# Patient Record
Sex: MALE | Race: WHITE | NOT HISPANIC OR LATINO | URBAN - METROPOLITAN AREA
[De-identification: names, ages, dates, MRNs, and addresses within clinical notes are randomized per-mention and may not be internally consistent; named-entity substitution may affect disease eponyms.]

---

## 2019-08-05 ENCOUNTER — OUTPATIENT (OUTPATIENT)
Dept: OUTPATIENT SERVICES | Facility: HOSPITAL | Age: 79
LOS: 1 days | End: 2019-08-05
Payer: MEDICARE

## 2019-08-05 VITALS
HEART RATE: 67 BPM | DIASTOLIC BLOOD PRESSURE: 76 MMHG | OXYGEN SATURATION: 97 % | WEIGHT: 197.98 LBS | HEIGHT: 65 IN | TEMPERATURE: 98 F | SYSTOLIC BLOOD PRESSURE: 136 MMHG | RESPIRATION RATE: 18 BRPM

## 2019-08-05 DIAGNOSIS — Z98.890 OTHER SPECIFIED POSTPROCEDURAL STATES: Chronic | ICD-10-CM

## 2019-08-05 DIAGNOSIS — Z01.818 ENCOUNTER FOR OTHER PREPROCEDURAL EXAMINATION: ICD-10-CM

## 2019-08-05 DIAGNOSIS — G56.02 CARPAL TUNNEL SYNDROME, LEFT UPPER LIMB: ICD-10-CM

## 2019-08-05 DIAGNOSIS — M65.4 RADIAL STYLOID TENOSYNOVITIS [DE QUERVAIN]: ICD-10-CM

## 2019-08-05 LAB
ALBUMIN SERPL ELPH-MCNC: 3.9 G/DL — SIGNIFICANT CHANGE UP (ref 3.3–5)
ALP SERPL-CCNC: 55 U/L — SIGNIFICANT CHANGE UP (ref 40–120)
ALT FLD-CCNC: 39 U/L — SIGNIFICANT CHANGE UP (ref 12–78)
ANION GAP SERPL CALC-SCNC: 8 MMOL/L — SIGNIFICANT CHANGE UP (ref 5–17)
AST SERPL-CCNC: 29 U/L — SIGNIFICANT CHANGE UP (ref 15–37)
BILIRUB SERPL-MCNC: 0.7 MG/DL — SIGNIFICANT CHANGE UP (ref 0.2–1.2)
BUN SERPL-MCNC: 17 MG/DL — SIGNIFICANT CHANGE UP (ref 7–23)
CALCIUM SERPL-MCNC: 9.1 MG/DL — SIGNIFICANT CHANGE UP (ref 8.5–10.1)
CHLORIDE SERPL-SCNC: 112 MMOL/L — HIGH (ref 96–108)
CO2 SERPL-SCNC: 23 MMOL/L — SIGNIFICANT CHANGE UP (ref 22–31)
CREAT SERPL-MCNC: 0.93 MG/DL — SIGNIFICANT CHANGE UP (ref 0.5–1.3)
GLUCOSE SERPL-MCNC: 117 MG/DL — HIGH (ref 70–99)
HCT VFR BLD CALC: 42.2 % — SIGNIFICANT CHANGE UP (ref 39–50)
HGB BLD-MCNC: 14.5 G/DL — SIGNIFICANT CHANGE UP (ref 13–17)
MCHC RBC-ENTMCNC: 31 PG — SIGNIFICANT CHANGE UP (ref 27–34)
MCHC RBC-ENTMCNC: 34.4 GM/DL — SIGNIFICANT CHANGE UP (ref 32–36)
MCV RBC AUTO: 90.4 FL — SIGNIFICANT CHANGE UP (ref 80–100)
NRBC # BLD: 0 /100 WBCS — SIGNIFICANT CHANGE UP (ref 0–0)
PLATELET # BLD AUTO: 228 K/UL — SIGNIFICANT CHANGE UP (ref 150–400)
POTASSIUM SERPL-MCNC: 3.6 MMOL/L — SIGNIFICANT CHANGE UP (ref 3.5–5.3)
POTASSIUM SERPL-SCNC: 3.6 MMOL/L — SIGNIFICANT CHANGE UP (ref 3.5–5.3)
PROT SERPL-MCNC: 7.1 G/DL — SIGNIFICANT CHANGE UP (ref 6–8.3)
RBC # BLD: 4.67 M/UL — SIGNIFICANT CHANGE UP (ref 4.2–5.8)
RBC # FLD: 13.8 % — SIGNIFICANT CHANGE UP (ref 10.3–14.5)
SODIUM SERPL-SCNC: 143 MMOL/L — SIGNIFICANT CHANGE UP (ref 135–145)
WBC # BLD: 5.57 K/UL — SIGNIFICANT CHANGE UP (ref 3.8–10.5)
WBC # FLD AUTO: 5.57 K/UL — SIGNIFICANT CHANGE UP (ref 3.8–10.5)

## 2019-08-05 PROCEDURE — 93005 ELECTROCARDIOGRAM TRACING: CPT

## 2019-08-05 PROCEDURE — 36415 COLL VENOUS BLD VENIPUNCTURE: CPT

## 2019-08-05 PROCEDURE — G0463: CPT

## 2019-08-05 PROCEDURE — 93010 ELECTROCARDIOGRAM REPORT: CPT

## 2019-08-05 PROCEDURE — 85027 COMPLETE CBC AUTOMATED: CPT

## 2019-08-05 PROCEDURE — 80053 COMPREHEN METABOLIC PANEL: CPT

## 2019-08-05 NOTE — H&P PST ADULT - ASSESSMENT
80 y/o M presents for PST for scheduled left wrist 1st dorsal compartment release on 8/14 with no complaints at this time,    - electively planned for OR on 8/14 80 y/o M presents for PST for scheduled left wrist 1st dorsal compartment release on 8/14 with no complaints at this time,    - electively planned for OR on 8/14  - ekg  - cbc, cmp

## 2019-08-05 NOTE — H&P PST ADULT - HISTORY OF PRESENT ILLNESS
80 y/o M presents to presurgical testing for clearance for left wrist 1st dorsal compartment release scheduled for 8/14 with Dr. August. Pt states he had been having pain in the left wrist 4-5 weeks, denying any inciting injuries or factors. Pt describes the pain as sharp, and states he has been having tingling in the left hand in the am. Pt states he has been wearing a wrist brace overnight, in addition to having cortisone shots with no alleviation of pain. Pt was scheduled for the OR, pt denies chest pain, sob, n/v/d.

## 2019-08-14 ENCOUNTER — OUTPATIENT (OUTPATIENT)
Dept: OUTPATIENT SERVICES | Facility: HOSPITAL | Age: 79
LOS: 1 days | End: 2019-08-14
Payer: MEDICARE

## 2019-08-14 VITALS
OXYGEN SATURATION: 95 % | WEIGHT: 197.98 LBS | HEART RATE: 61 BPM | HEIGHT: 65 IN | RESPIRATION RATE: 15 BRPM | DIASTOLIC BLOOD PRESSURE: 70 MMHG | TEMPERATURE: 98 F | SYSTOLIC BLOOD PRESSURE: 134 MMHG

## 2019-08-14 VITALS
HEART RATE: 58 BPM | OXYGEN SATURATION: 97 % | SYSTOLIC BLOOD PRESSURE: 132 MMHG | RESPIRATION RATE: 15 BRPM | DIASTOLIC BLOOD PRESSURE: 60 MMHG

## 2019-08-14 DIAGNOSIS — Z98.890 OTHER SPECIFIED POSTPROCEDURAL STATES: Chronic | ICD-10-CM

## 2019-08-14 DIAGNOSIS — Z01.818 ENCOUNTER FOR OTHER PREPROCEDURAL EXAMINATION: ICD-10-CM

## 2019-08-14 DIAGNOSIS — G56.02 CARPAL TUNNEL SYNDROME, LEFT UPPER LIMB: ICD-10-CM

## 2019-08-14 DIAGNOSIS — M65.4 RADIAL STYLOID TENOSYNOVITIS [DE QUERVAIN]: ICD-10-CM

## 2019-08-14 PROCEDURE — 88304 TISSUE EXAM BY PATHOLOGIST: CPT | Mod: 26

## 2019-08-14 PROCEDURE — 88304 TISSUE EXAM BY PATHOLOGIST: CPT

## 2019-08-14 PROCEDURE — 25000 INCISION OF TENDON SHEATH: CPT | Mod: LT

## 2019-08-14 RX ORDER — CEFAZOLIN SODIUM 1 G
2000 VIAL (EA) INJECTION ONCE
Refills: 0 | Status: COMPLETED | OUTPATIENT
Start: 2019-08-14 | End: 2019-08-14

## 2019-08-14 RX ORDER — DOCUSATE SODIUM 100 MG
1 CAPSULE ORAL
Qty: 28 | Refills: 0
Start: 2019-08-14 | End: 2019-08-27

## 2019-08-14 RX ORDER — ONDANSETRON 8 MG/1
4 TABLET, FILM COATED ORAL ONCE
Refills: 0 | Status: DISCONTINUED | OUTPATIENT
Start: 2019-08-14 | End: 2019-08-14

## 2019-08-14 RX ORDER — SODIUM CHLORIDE 9 MG/ML
1000 INJECTION, SOLUTION INTRAVENOUS
Refills: 0 | Status: DISCONTINUED | OUTPATIENT
Start: 2019-08-14 | End: 2019-08-14

## 2019-08-14 RX ORDER — ASPIRIN/CALCIUM CARB/MAGNESIUM 324 MG
1 TABLET ORAL
Qty: 0 | Refills: 0 | DISCHARGE

## 2019-08-14 RX ORDER — ACETAMINOPHEN 500 MG
650 TABLET ORAL ONCE
Refills: 0 | Status: COMPLETED | OUTPATIENT
Start: 2019-08-14 | End: 2019-08-14

## 2019-08-14 RX ORDER — OXYCODONE HYDROCHLORIDE 5 MG/1
5 TABLET ORAL ONCE
Refills: 0 | Status: DISCONTINUED | OUTPATIENT
Start: 2019-08-14 | End: 2019-08-14

## 2019-08-14 RX ORDER — HYDROMORPHONE HYDROCHLORIDE 2 MG/ML
0.5 INJECTION INTRAMUSCULAR; INTRAVENOUS; SUBCUTANEOUS
Refills: 0 | Status: DISCONTINUED | OUTPATIENT
Start: 2019-08-14 | End: 2019-08-14

## 2019-08-14 RX ADMIN — SODIUM CHLORIDE 100 MILLILITER(S): 9 INJECTION, SOLUTION INTRAVENOUS at 16:26

## 2019-08-14 RX ADMIN — Medication 650 MILLIGRAM(S): at 12:30

## 2019-08-14 RX ADMIN — SODIUM CHLORIDE 60 MILLILITER(S): 9 INJECTION, SOLUTION INTRAVENOUS at 12:23

## 2019-08-14 NOTE — ASU DISCHARGE PLAN (ADULT/PEDIATRIC) - CALL YOUR DOCTOR IF YOU HAVE ANY OF THE FOLLOWING:
Numbness, tingling, color or temperature change to extremity/Swelling that gets worse/Bleeding that does not stop/Fever greater than (need to indicate Fahrenheit or Celsius)/Wound/Surgical Site with redness, or foul smelling discharge or pus

## 2019-08-14 NOTE — ASU DISCHARGE PLAN (ADULT/PEDIATRIC) - ASU DC SPECIAL INSTRUCTIONSFT
1. Keep dressing on for 5 days. Cover hand with plastic bag for showering.    2. Elevate hand as much as possible and wiggle fingers as much as you can, as often as you think of it (wiggle 10 times every commercial  if you are watching TV, or reading a book after every 5 pages read). The exception is if you have a splint you will not be able to wiggle the affected digit. Try to wiggle the free ones though.    3. Walk plenty, no sitting around.  4. See Dr. Dumont in the office in about 7-10 days. Call to schedule. You will have your wound checked then, any sutures will be removed, and your splint (if you have one on) will be changed.

## 2019-09-23 PROBLEM — E78.5 HYPERLIPIDEMIA, UNSPECIFIED: Chronic | Status: ACTIVE | Noted: 2019-08-05

## 2019-09-23 PROBLEM — N40.0 BENIGN PROSTATIC HYPERPLASIA WITHOUT LOWER URINARY TRACT SYMPTOMS: Chronic | Status: ACTIVE | Noted: 2019-08-05

## 2019-09-23 PROBLEM — I10 ESSENTIAL (PRIMARY) HYPERTENSION: Chronic | Status: ACTIVE | Noted: 2019-08-05

## 2019-09-27 ENCOUNTER — OUTPATIENT (OUTPATIENT)
Dept: OUTPATIENT SERVICES | Facility: HOSPITAL | Age: 79
LOS: 1 days | End: 2019-09-27
Payer: MEDICARE

## 2019-09-27 VITALS
WEIGHT: 195.99 LBS | HEART RATE: 65 BPM | HEIGHT: 66 IN | RESPIRATION RATE: 16 BRPM | DIASTOLIC BLOOD PRESSURE: 75 MMHG | TEMPERATURE: 98 F | SYSTOLIC BLOOD PRESSURE: 141 MMHG

## 2019-09-27 DIAGNOSIS — Z01.818 ENCOUNTER FOR OTHER PREPROCEDURAL EXAMINATION: ICD-10-CM

## 2019-09-27 DIAGNOSIS — Z98.890 OTHER SPECIFIED POSTPROCEDURAL STATES: Chronic | ICD-10-CM

## 2019-09-27 DIAGNOSIS — M65.4 RADIAL STYLOID TENOSYNOVITIS [DE QUERVAIN]: ICD-10-CM

## 2019-09-27 DIAGNOSIS — G56.01 CARPAL TUNNEL SYNDROME, RIGHT UPPER LIMB: ICD-10-CM

## 2019-09-27 DIAGNOSIS — I10 ESSENTIAL (PRIMARY) HYPERTENSION: ICD-10-CM

## 2019-09-27 DIAGNOSIS — Z98.890 OTHER SPECIFIED POSTPROCEDURAL STATES: ICD-10-CM

## 2019-09-27 LAB
ANION GAP SERPL CALC-SCNC: 7 MMOL/L — SIGNIFICANT CHANGE UP (ref 5–17)
BASOPHILS # BLD AUTO: 0.02 K/UL — SIGNIFICANT CHANGE UP (ref 0–0.2)
BASOPHILS NFR BLD AUTO: 0.5 % — SIGNIFICANT CHANGE UP (ref 0–2)
BUN SERPL-MCNC: 14 MG/DL — SIGNIFICANT CHANGE UP (ref 7–23)
CALCIUM SERPL-MCNC: 8.4 MG/DL — LOW (ref 8.5–10.1)
CHLORIDE SERPL-SCNC: 112 MMOL/L — HIGH (ref 96–108)
CO2 SERPL-SCNC: 24 MMOL/L — SIGNIFICANT CHANGE UP (ref 22–31)
CREAT SERPL-MCNC: 0.93 MG/DL — SIGNIFICANT CHANGE UP (ref 0.5–1.3)
EOSINOPHIL # BLD AUTO: 0.04 K/UL — SIGNIFICANT CHANGE UP (ref 0–0.5)
EOSINOPHIL NFR BLD AUTO: 1 % — SIGNIFICANT CHANGE UP (ref 0–6)
GLUCOSE SERPL-MCNC: 143 MG/DL — HIGH (ref 70–99)
HCT VFR BLD CALC: 40.4 % — SIGNIFICANT CHANGE UP (ref 39–50)
HGB BLD-MCNC: 13.8 G/DL — SIGNIFICANT CHANGE UP (ref 13–17)
IMM GRANULOCYTES NFR BLD AUTO: 0 % — SIGNIFICANT CHANGE UP (ref 0–1.5)
LYMPHOCYTES # BLD AUTO: 1.38 K/UL — SIGNIFICANT CHANGE UP (ref 1–3.3)
LYMPHOCYTES # BLD AUTO: 33.7 % — SIGNIFICANT CHANGE UP (ref 13–44)
MCHC RBC-ENTMCNC: 30.9 PG — SIGNIFICANT CHANGE UP (ref 27–34)
MCHC RBC-ENTMCNC: 34.2 GM/DL — SIGNIFICANT CHANGE UP (ref 32–36)
MCV RBC AUTO: 90.4 FL — SIGNIFICANT CHANGE UP (ref 80–100)
MONOCYTES # BLD AUTO: 0.42 K/UL — SIGNIFICANT CHANGE UP (ref 0–0.9)
MONOCYTES NFR BLD AUTO: 10.2 % — SIGNIFICANT CHANGE UP (ref 2–14)
NEUTROPHILS # BLD AUTO: 2.24 K/UL — SIGNIFICANT CHANGE UP (ref 1.8–7.4)
NEUTROPHILS NFR BLD AUTO: 54.6 % — SIGNIFICANT CHANGE UP (ref 43–77)
NRBC # BLD: 0 /100 WBCS — SIGNIFICANT CHANGE UP (ref 0–0)
PLATELET # BLD AUTO: 213 K/UL — SIGNIFICANT CHANGE UP (ref 150–400)
POTASSIUM SERPL-MCNC: 3.8 MMOL/L — SIGNIFICANT CHANGE UP (ref 3.5–5.3)
POTASSIUM SERPL-SCNC: 3.8 MMOL/L — SIGNIFICANT CHANGE UP (ref 3.5–5.3)
RBC # BLD: 4.47 M/UL — SIGNIFICANT CHANGE UP (ref 4.2–5.8)
RBC # FLD: 13.6 % — SIGNIFICANT CHANGE UP (ref 10.3–14.5)
SODIUM SERPL-SCNC: 143 MMOL/L — SIGNIFICANT CHANGE UP (ref 135–145)
WBC # BLD: 4.1 K/UL — SIGNIFICANT CHANGE UP (ref 3.8–10.5)
WBC # FLD AUTO: 4.1 K/UL — SIGNIFICANT CHANGE UP (ref 3.8–10.5)

## 2019-09-27 PROCEDURE — 85027 COMPLETE CBC AUTOMATED: CPT

## 2019-09-27 PROCEDURE — G0463: CPT

## 2019-09-27 PROCEDURE — 36415 COLL VENOUS BLD VENIPUNCTURE: CPT

## 2019-09-27 PROCEDURE — 80048 BASIC METABOLIC PNL TOTAL CA: CPT

## 2019-09-27 NOTE — H&P PST ADULT - NSANTHTOTALSCORECAL_ENT_A_CORE
Pt's wife Dane Sheets left vm this morning to cancel 10/3 appt with Dr. Deepthi Ferris, as pt can't stand on right leg due to pain. Dane Cordon asked for nurse to return call. 4

## 2019-09-27 NOTE — H&P PST ADULT - NSICDXPASTMEDICALHX_GEN_ALL_CORE_FT
PAST MEDICAL HISTORY:  BPH without urinary obstruction     History of carpal tunnel release     Hyperlipidemia     Hypertension

## 2019-09-27 NOTE — H&P PST ADULT - GENITOURINARY
Problem: Urinary Tract Infection (Adult)  Goal: Signs and Symptoms of Listed Potential Problems Will be Absent, Minimized or Managed (Urinary Tract Infection)  Signs and symptoms of listed potential problems will be absent, minimized or managed by discharge/transition of care (reference Urinary Tract Infection (Adult) CPG).   Outcome: No Change  Pt remains hospitalized for UTI, continues IV Levaquin.   A&O, forgetful. Vital signs stable, denies any pain, on RA.   PIV saline locked.   Up to BSC x 2 with assist x 1-2 with walker and gait belt.   Unable to void both times, bladder scanned for 392.  Not incont this shift.   Pt requested PRN Neb @ 0510 respiratory paged.   Will continue to monitor.     Attempted to straight cath pt and end of shift, not able to get any urine out. Passed along to oncoming RN. Dentures cleaned and in place.      details…

## 2019-09-27 NOTE — H&P PST ADULT - HISTORY OF PRESENT ILLNESS
78 y/o male presents to PST for scheduled right carpal tunnel release on 10/9/19. Patient reports pain and numbness mainly at night to right hand x several years, received steroid injection with minor relief.

## 2019-10-09 ENCOUNTER — OUTPATIENT (OUTPATIENT)
Dept: OUTPATIENT SERVICES | Facility: HOSPITAL | Age: 79
LOS: 1 days | End: 2019-10-09
Payer: MEDICARE

## 2019-10-09 VITALS
RESPIRATION RATE: 16 BRPM | WEIGHT: 197.98 LBS | HEART RATE: 52 BPM | TEMPERATURE: 98 F | SYSTOLIC BLOOD PRESSURE: 128 MMHG | DIASTOLIC BLOOD PRESSURE: 64 MMHG | HEIGHT: 66 IN | OXYGEN SATURATION: 97 %

## 2019-10-09 VITALS
OXYGEN SATURATION: 99 % | SYSTOLIC BLOOD PRESSURE: 132 MMHG | RESPIRATION RATE: 18 BRPM | DIASTOLIC BLOOD PRESSURE: 70 MMHG | HEART RATE: 63 BPM

## 2019-10-09 DIAGNOSIS — G56.01 CARPAL TUNNEL SYNDROME, RIGHT UPPER LIMB: ICD-10-CM

## 2019-10-09 DIAGNOSIS — Z98.890 OTHER SPECIFIED POSTPROCEDURAL STATES: Chronic | ICD-10-CM

## 2019-10-09 DIAGNOSIS — Z01.818 ENCOUNTER FOR OTHER PREPROCEDURAL EXAMINATION: ICD-10-CM

## 2019-10-09 DIAGNOSIS — M65.4 RADIAL STYLOID TENOSYNOVITIS [DE QUERVAIN]: ICD-10-CM

## 2019-10-09 PROCEDURE — 64721 CARPAL TUNNEL SURGERY: CPT | Mod: RT

## 2019-10-09 RX ORDER — OXYCODONE HYDROCHLORIDE 5 MG/1
5 TABLET ORAL ONCE
Refills: 0 | Status: DISCONTINUED | OUTPATIENT
Start: 2019-10-09 | End: 2019-10-09

## 2019-10-09 RX ORDER — SODIUM CHLORIDE 9 MG/ML
1000 INJECTION, SOLUTION INTRAVENOUS
Refills: 0 | Status: DISCONTINUED | OUTPATIENT
Start: 2019-10-09 | End: 2019-10-09

## 2019-10-09 RX ORDER — ONDANSETRON 8 MG/1
4 TABLET, FILM COATED ORAL ONCE
Refills: 0 | Status: DISCONTINUED | OUTPATIENT
Start: 2019-10-09 | End: 2019-10-09

## 2019-10-09 RX ORDER — CEFAZOLIN SODIUM 1 G
2000 VIAL (EA) INJECTION ONCE
Refills: 0 | Status: COMPLETED | OUTPATIENT
Start: 2019-10-09 | End: 2019-10-09

## 2019-10-09 RX ORDER — OXYCODONE HYDROCHLORIDE 5 MG/1
10 TABLET ORAL ONCE
Refills: 0 | Status: DISCONTINUED | OUTPATIENT
Start: 2019-10-09 | End: 2019-10-09

## 2019-10-09 RX ORDER — HYDROMORPHONE HYDROCHLORIDE 2 MG/ML
0.5 INJECTION INTRAMUSCULAR; INTRAVENOUS; SUBCUTANEOUS
Refills: 0 | Status: DISCONTINUED | OUTPATIENT
Start: 2019-10-09 | End: 2019-10-09

## 2019-10-09 RX ADMIN — SODIUM CHLORIDE 75 MILLILITER(S): 9 INJECTION, SOLUTION INTRAVENOUS at 09:40

## 2019-10-09 RX ADMIN — SODIUM CHLORIDE 30 MILLILITER(S): 9 INJECTION, SOLUTION INTRAVENOUS at 07:00

## 2019-10-09 NOTE — ASU PREOP CHECKLIST - VERIFY SURGICAL SITE/SIDE WITH PATIENT
Reviewed. Will refill when pharmacy is identified. Will route to sender to contact patient and update pharmacy for refill.   done/needs

## 2019-10-09 NOTE — ASU DISCHARGE PLAN (ADULT/PEDIATRIC) - CARE PROVIDER_API CALL
Laura August)  Orthopaedic Surgery  [address] [place] [address]  Phone: [phone]  Fax: [phone]  Follow Up Time:

## 2020-04-20 NOTE — H&P PST ADULT - BP NONINVASIVE SYSTOLIC (MM HG)
F: none  E: K >4, Mag >2  N: dysphagia; pureed and thin liquids per speech/swallow  GI: Famotidine 20mg daily  DVT ppx: lovenox 40mg subQ q24h  Code Status: DNR/DNI, confirmed NOT comfort care 136

## 2020-07-11 ENCOUNTER — INPATIENT (INPATIENT)
Facility: HOSPITAL | Age: 80
LOS: 2 days | Discharge: HOME | DRG: 554 | End: 2020-07-14
Attending: HOSPITALIST | Admitting: HOSPITALIST
Payer: MEDICARE

## 2020-07-11 VITALS
SYSTOLIC BLOOD PRESSURE: 145 MMHG | HEART RATE: 82 BPM | WEIGHT: 199.96 LBS | TEMPERATURE: 99 F | DIASTOLIC BLOOD PRESSURE: 82 MMHG | RESPIRATION RATE: 18 BRPM | OXYGEN SATURATION: 98 %

## 2020-07-11 DIAGNOSIS — Z98.890 OTHER SPECIFIED POSTPROCEDURAL STATES: Chronic | ICD-10-CM

## 2020-07-11 DIAGNOSIS — I10 ESSENTIAL (PRIMARY) HYPERTENSION: ICD-10-CM

## 2020-07-11 DIAGNOSIS — L03.116 CELLULITIS OF LEFT LOWER LIMB: ICD-10-CM

## 2020-07-11 DIAGNOSIS — Z79.899 OTHER LONG TERM (CURRENT) DRUG THERAPY: ICD-10-CM

## 2020-07-11 DIAGNOSIS — Z29.9 ENCOUNTER FOR PROPHYLACTIC MEASURES, UNSPECIFIED: ICD-10-CM

## 2020-07-11 DIAGNOSIS — E78.5 HYPERLIPIDEMIA, UNSPECIFIED: ICD-10-CM

## 2020-07-11 DIAGNOSIS — F41.9 ANXIETY DISORDER, UNSPECIFIED: ICD-10-CM

## 2020-07-11 DIAGNOSIS — N40.0 BENIGN PROSTATIC HYPERPLASIA WITHOUT LOWER URINARY TRACT SYMPTOMS: ICD-10-CM

## 2020-07-11 LAB
ALBUMIN SERPL ELPH-MCNC: 3.5 G/DL — SIGNIFICANT CHANGE UP (ref 3.3–5)
ALP SERPL-CCNC: 53 U/L — SIGNIFICANT CHANGE UP (ref 40–120)
ALT FLD-CCNC: 31 U/L — SIGNIFICANT CHANGE UP (ref 12–78)
ANION GAP SERPL CALC-SCNC: 8 MMOL/L — SIGNIFICANT CHANGE UP (ref 5–17)
AST SERPL-CCNC: 24 U/L — SIGNIFICANT CHANGE UP (ref 15–37)
B PERT IGG+IGM PNL SER: ABNORMAL
BASOPHILS # BLD AUTO: 0.02 K/UL — SIGNIFICANT CHANGE UP (ref 0–0.2)
BASOPHILS NFR BLD AUTO: 0.2 % — SIGNIFICANT CHANGE UP (ref 0–2)
BILIRUB SERPL-MCNC: 0.8 MG/DL — SIGNIFICANT CHANGE UP (ref 0.2–1.2)
BUN SERPL-MCNC: 23 MG/DL — SIGNIFICANT CHANGE UP (ref 7–23)
CALCIUM SERPL-MCNC: 9.1 MG/DL — SIGNIFICANT CHANGE UP (ref 8.5–10.1)
CHLORIDE SERPL-SCNC: 112 MMOL/L — HIGH (ref 96–108)
CO2 SERPL-SCNC: 22 MMOL/L — SIGNIFICANT CHANGE UP (ref 22–31)
COLOR FLD: YELLOW — SIGNIFICANT CHANGE UP
CREAT SERPL-MCNC: 1.1 MG/DL — SIGNIFICANT CHANGE UP (ref 0.5–1.3)
CRP SERPL-MCNC: 15.07 MG/DL — HIGH (ref 0–0.4)
EOSINOPHIL # BLD AUTO: 0 K/UL — SIGNIFICANT CHANGE UP (ref 0–0.5)
EOSINOPHIL NFR BLD AUTO: 0 % — SIGNIFICANT CHANGE UP (ref 0–6)
ERYTHROCYTE [SEDIMENTATION RATE] IN BLOOD: 72 MM/HR — HIGH (ref 0–20)
FLUID INTAKE SUBSTANCE CLASS: SIGNIFICANT CHANGE UP
FLUID SEGMENTED GRANULOCYTES: 50 % — SIGNIFICANT CHANGE UP
GLUCOSE SERPL-MCNC: 94 MG/DL — SIGNIFICANT CHANGE UP (ref 70–99)
GRAM STN FLD: SIGNIFICANT CHANGE UP
HCT VFR BLD CALC: 40.4 % — SIGNIFICANT CHANGE UP (ref 39–50)
HGB BLD-MCNC: 13.7 G/DL — SIGNIFICANT CHANGE UP (ref 13–17)
IMM GRANULOCYTES NFR BLD AUTO: 0.3 % — SIGNIFICANT CHANGE UP (ref 0–1.5)
LACTATE SERPL-SCNC: 1.2 MMOL/L — SIGNIFICANT CHANGE UP (ref 0.7–2)
LYMPHOCYTES # BLD AUTO: 1.08 K/UL — SIGNIFICANT CHANGE UP (ref 1–3.3)
LYMPHOCYTES # BLD AUTO: 8.4 % — LOW (ref 13–44)
LYMPHOCYTES # FLD: 13 % — SIGNIFICANT CHANGE UP
MCHC RBC-ENTMCNC: 30.9 PG — SIGNIFICANT CHANGE UP (ref 27–34)
MCHC RBC-ENTMCNC: 33.9 GM/DL — SIGNIFICANT CHANGE UP (ref 32–36)
MCV RBC AUTO: 91 FL — SIGNIFICANT CHANGE UP (ref 80–100)
MONOCYTES # BLD AUTO: 1.09 K/UL — HIGH (ref 0–0.9)
MONOCYTES NFR BLD AUTO: 8.5 % — SIGNIFICANT CHANGE UP (ref 2–14)
MONOS+MACROS # FLD: 37 % — SIGNIFICANT CHANGE UP
NEUTROPHILS # BLD AUTO: 10.61 K/UL — HIGH (ref 1.8–7.4)
NEUTROPHILS NFR BLD AUTO: 82.6 % — HIGH (ref 43–77)
NRBC # BLD: 0 /100 WBCS — SIGNIFICANT CHANGE UP (ref 0–0)
PLATELET # BLD AUTO: 229 K/UL — SIGNIFICANT CHANGE UP (ref 150–400)
POTASSIUM SERPL-MCNC: 4.3 MMOL/L — SIGNIFICANT CHANGE UP (ref 3.5–5.3)
POTASSIUM SERPL-SCNC: 4.3 MMOL/L — SIGNIFICANT CHANGE UP (ref 3.5–5.3)
PROT SERPL-MCNC: 7.7 G/DL — SIGNIFICANT CHANGE UP (ref 6–8.3)
RBC # BLD: 4.44 M/UL — SIGNIFICANT CHANGE UP (ref 4.2–5.8)
RBC # FLD: 14.4 % — SIGNIFICANT CHANGE UP (ref 10.3–14.5)
RCV VOL RI: 1000 /UL — HIGH (ref 0–0)
SARS-COV-2 RNA SPEC QL NAA+PROBE: SIGNIFICANT CHANGE UP
SODIUM SERPL-SCNC: 142 MMOL/L — SIGNIFICANT CHANGE UP (ref 135–145)
SYNOVIAL CRYSTALS CLARITY: ABNORMAL
SYNOVIAL CRYSTALS COLOR: YELLOW
SYNOVIAL CRYSTALS ID: ABNORMAL
SYNOVIAL CRYSTALS TUBE: SIGNIFICANT CHANGE UP
TOTAL NUCLEATED CELL COUNT, BODY FLUID: 3501 /UL — SIGNIFICANT CHANGE UP
TUBE TYPE: SIGNIFICANT CHANGE UP
URATE SERPL-MCNC: 5.1 MG/DL — SIGNIFICANT CHANGE UP (ref 3.4–8.8)
WBC # BLD: 12.84 K/UL — HIGH (ref 3.8–10.5)
WBC # FLD AUTO: 12.84 K/UL — HIGH (ref 3.8–10.5)

## 2020-07-11 PROCEDURE — 73562 X-RAY EXAM OF KNEE 3: CPT | Mod: 26,LT

## 2020-07-11 PROCEDURE — 93971 EXTREMITY STUDY: CPT | Mod: 26,LT

## 2020-07-11 PROCEDURE — 99223 1ST HOSP IP/OBS HIGH 75: CPT | Mod: GC,AI

## 2020-07-11 PROCEDURE — 93010 ELECTROCARDIOGRAM REPORT: CPT

## 2020-07-11 PROCEDURE — 99285 EMERGENCY DEPT VISIT HI MDM: CPT

## 2020-07-11 RX ORDER — FENOFIBRATE,MICRONIZED 130 MG
145 CAPSULE ORAL DAILY
Refills: 0 | Status: DISCONTINUED | OUTPATIENT
Start: 2020-07-12 | End: 2020-07-14

## 2020-07-11 RX ORDER — VANCOMYCIN HCL 1 G
1000 VIAL (EA) INTRAVENOUS EVERY 12 HOURS
Refills: 0 | Status: DISCONTINUED | OUTPATIENT
Start: 2020-07-12 | End: 2020-07-12

## 2020-07-11 RX ORDER — SODIUM CHLORIDE 9 MG/ML
1000 INJECTION INTRAMUSCULAR; INTRAVENOUS; SUBCUTANEOUS ONCE
Refills: 0 | Status: COMPLETED | OUTPATIENT
Start: 2020-07-11 | End: 2020-07-11

## 2020-07-11 RX ORDER — MORPHINE SULFATE 50 MG/1
4 CAPSULE, EXTENDED RELEASE ORAL ONCE
Refills: 0 | Status: DISCONTINUED | OUTPATIENT
Start: 2020-07-11 | End: 2020-07-11

## 2020-07-11 RX ORDER — METOPROLOL TARTRATE 50 MG
25 TABLET ORAL DAILY
Refills: 0 | Status: DISCONTINUED | OUTPATIENT
Start: 2020-07-11 | End: 2020-07-14

## 2020-07-11 RX ORDER — FENOFIBRATE,MICRONIZED 130 MG
145 CAPSULE ORAL ONCE
Refills: 0 | Status: COMPLETED | OUTPATIENT
Start: 2020-07-11 | End: 2020-07-11

## 2020-07-11 RX ORDER — ATORVASTATIN CALCIUM 80 MG/1
20 TABLET, FILM COATED ORAL AT BEDTIME
Refills: 0 | Status: DISCONTINUED | OUTPATIENT
Start: 2020-07-11 | End: 2020-07-14

## 2020-07-11 RX ORDER — TAMSULOSIN HYDROCHLORIDE 0.4 MG/1
0.4 CAPSULE ORAL AT BEDTIME
Refills: 0 | Status: DISCONTINUED | OUTPATIENT
Start: 2020-07-11 | End: 2020-07-14

## 2020-07-11 RX ORDER — MORPHINE SULFATE 50 MG/1
2 CAPSULE, EXTENDED RELEASE ORAL ONCE
Refills: 0 | Status: DISCONTINUED | OUTPATIENT
Start: 2020-07-11 | End: 2020-07-11

## 2020-07-11 RX ORDER — ASPIRIN/CALCIUM CARB/MAGNESIUM 324 MG
81 TABLET ORAL DAILY
Refills: 0 | Status: DISCONTINUED | OUTPATIENT
Start: 2020-07-11 | End: 2020-07-14

## 2020-07-11 RX ORDER — MORPHINE SULFATE 50 MG/1
2 CAPSULE, EXTENDED RELEASE ORAL EVERY 4 HOURS
Refills: 0 | Status: DISCONTINUED | OUTPATIENT
Start: 2020-07-11 | End: 2020-07-12

## 2020-07-11 RX ORDER — ACETAMINOPHEN 500 MG
650 TABLET ORAL EVERY 6 HOURS
Refills: 0 | Status: DISCONTINUED | OUTPATIENT
Start: 2020-07-11 | End: 2020-07-14

## 2020-07-11 RX ORDER — PIPERACILLIN AND TAZOBACTAM 4; .5 G/20ML; G/20ML
3.38 INJECTION, POWDER, LYOPHILIZED, FOR SOLUTION INTRAVENOUS EVERY 8 HOURS
Refills: 0 | Status: DISCONTINUED | OUTPATIENT
Start: 2020-07-11 | End: 2020-07-12

## 2020-07-11 RX ORDER — VANCOMYCIN HCL 1 G
1000 VIAL (EA) INTRAVENOUS ONCE
Refills: 0 | Status: COMPLETED | OUTPATIENT
Start: 2020-07-11 | End: 2020-07-11

## 2020-07-11 RX ORDER — OXYCODONE AND ACETAMINOPHEN 5; 325 MG/1; MG/1
1 TABLET ORAL EVERY 6 HOURS
Refills: 0 | Status: DISCONTINUED | OUTPATIENT
Start: 2020-07-11 | End: 2020-07-12

## 2020-07-11 RX ORDER — FENOFIBRATE,MICRONIZED 130 MG
130 CAPSULE ORAL ONCE
Refills: 0 | Status: DISCONTINUED | OUTPATIENT
Start: 2020-07-11 | End: 2020-07-11

## 2020-07-11 RX ORDER — SODIUM CHLORIDE 9 MG/ML
1000 INJECTION, SOLUTION INTRAVENOUS
Refills: 0 | Status: DISCONTINUED | OUTPATIENT
Start: 2020-07-11 | End: 2020-07-11

## 2020-07-11 RX ORDER — PIPERACILLIN AND TAZOBACTAM 4; .5 G/20ML; G/20ML
3.38 INJECTION, POWDER, LYOPHILIZED, FOR SOLUTION INTRAVENOUS ONCE
Refills: 0 | Status: COMPLETED | OUTPATIENT
Start: 2020-07-11 | End: 2020-07-11

## 2020-07-11 RX ADMIN — MORPHINE SULFATE 4 MILLIGRAM(S): 50 CAPSULE, EXTENDED RELEASE ORAL at 15:15

## 2020-07-11 RX ADMIN — PIPERACILLIN AND TAZOBACTAM 200 GRAM(S): 4; .5 INJECTION, POWDER, LYOPHILIZED, FOR SOLUTION INTRAVENOUS at 18:01

## 2020-07-11 RX ADMIN — Medication 250 MILLIGRAM(S): at 18:36

## 2020-07-11 RX ADMIN — MORPHINE SULFATE 2 MILLIGRAM(S): 50 CAPSULE, EXTENDED RELEASE ORAL at 23:08

## 2020-07-11 RX ADMIN — PIPERACILLIN AND TAZOBACTAM 3.38 GRAM(S): 4; .5 INJECTION, POWDER, LYOPHILIZED, FOR SOLUTION INTRAVENOUS at 18:35

## 2020-07-11 RX ADMIN — SODIUM CHLORIDE 1000 MILLILITER(S): 9 INJECTION INTRAMUSCULAR; INTRAVENOUS; SUBCUTANEOUS at 15:15

## 2020-07-11 RX ADMIN — PIPERACILLIN AND TAZOBACTAM 25 GRAM(S): 4; .5 INJECTION, POWDER, LYOPHILIZED, FOR SOLUTION INTRAVENOUS at 22:16

## 2020-07-11 RX ADMIN — SODIUM CHLORIDE 75 MILLILITER(S): 9 INJECTION, SOLUTION INTRAVENOUS at 18:37

## 2020-07-11 RX ADMIN — Medication 145 MILLIGRAM(S): at 23:03

## 2020-07-11 RX ADMIN — MORPHINE SULFATE 2 MILLIGRAM(S): 50 CAPSULE, EXTENDED RELEASE ORAL at 19:37

## 2020-07-11 RX ADMIN — SODIUM CHLORIDE 1000 MILLILITER(S): 9 INJECTION INTRAMUSCULAR; INTRAVENOUS; SUBCUTANEOUS at 16:30

## 2020-07-11 RX ADMIN — ATORVASTATIN CALCIUM 20 MILLIGRAM(S): 80 TABLET, FILM COATED ORAL at 23:03

## 2020-07-11 NOTE — H&P ADULT - PROBLEM SELECTOR PLAN 7
- lovenox 40mg QD  - IMPROVE VTE Individual Risk Assessment          RISK                                                          Points  [  ] Previous VTE                                                3  [  ] Thrombophilia                                             2  [  ] Lower limb paralysis                                   2        (unable to hold up >15 seconds)    [  ] Current Cancer                                             2         (within 6 months)  [  ] Immobilization > 24 hrs                              1  [  ] ICU/CCU stay > 24 hours                             1  [ x ] Age > 60                                                         1    IMPROVE VTE Score: 1

## 2020-07-11 NOTE — H&P ADULT - HISTORY OF PRESENT ILLNESS
81 yo male with PMhx of prostate cancer, HTN, HLD presents to ED with left knee pain. Pt was gardening on his knees 2 days ago and began to feel right knee pain. He's been treating his pain with aspirin and decided to come to ED when he became unable to ambulate 2/2 the pain. Denies fevers, chest pain, HA, abd pain, rashes, any other joint pain. 81 yo male with PMhx BPH, HTN, HLD presents to ED with left knee pain. Pt was gardening on his knees 2 days ago and began to feel right knee pain. He's been treating his pain with aspirin and decided to come to ED when he became unable to ambulate 2/2 the pain. Pt normally able to ambulate well, very active. Denies fevers, chest pain, HA, abd pain, rashes, any other joint pain, chemotherapy hx.    In ED:   Vital Signs: T(F): 98.2, HR: 73, BP: 126/72, RR: 16, SpO2: 96%     Given: NS 1L bolus, vanco 1g, zosyn 3.375g, morphine 4mg    Labs: WBC 12.84, sed rate 72, Cr 1.10    Imaging: doppler negative for DVT left leg, left knee xray: Mild osteoarthritic change. Suprapatellar effusion,     Consult: ortho -Low suspicion for joint involvement, minimal effusion. Knee aspirated with 10cc of straw colored normal synovial fluid, likely component of early prepatellar/infrapatellar bursitis but no focal fluid collection to I+D,  Unlikely to require surgical intervention based on current presentation    Pt admitted for abx 2/2 cellulitis 79 yo male with PMhx BPH, HTN, HLD, colon cancer 4 years ago, presents to ED with left knee pain. Pt was gardening on his knees 2 days ago and began to feel right knee pain. He's been treating his pain with aspirin and decided to come to ED when he became unable to ambulate 2/2 the pain. Pt normally able to ambulate well, very active. Denies fevers, chest pain, HA, abd pain, rashes, any other joint pain, chemotherapy/radiation hx.    In ED:   Vital Signs: T(F): 98.2, HR: 73, BP: 126/72, RR: 16, SpO2: 96%     Given: NS 1L bolus, vanco 1g, zosyn 3.375g, morphine 4mg    Labs: WBC 12.84, sed rate 72, Cr 1.10    Imaging: doppler negative for DVT left leg, left knee xray: Mild osteoarthritic change. Suprapatellar effusion,     Consult: ortho -Low suspicion for joint involvement, minimal effusion. Knee aspirated with 10cc of straw colored normal synovial fluid, likely component of early prepatellar/infrapatellar bursitis but no focal fluid collection to I+D, Unlikely to require surgical intervention based on current presentation    Pt admitted for abx 2/2 cellulitis 81 yo male with PMhx BPH, HTN, HLD, colon cancer 4 years ago, presents to ED with left knee pain. Pt was gardening on his knees 2 days ago and began to feel right knee pain. He's been treating his pain with aspirin and decided to come to ED when he became unable to ambulate 2/2 the pain. Pt normally able to ambulate well, very active. Denies fevers, chest pain, HA, abd pain, rashes, any other joint pain, chemotherapy/radiation hx.    In ED:     Given: NS 1L bolus, vanco 1g, zosyn 3.375g, morphine 4mg    Labs: WBC 12.84, sed rate 72, Cr 1.10    Imaging: doppler negative for DVT left leg, left knee xray: Mild osteoarthritic change. Suprapatellar effusion,     Consult: ortho -Low suspicion for joint involvement, minimal effusion. Knee aspirated with 10cc of straw colored normal synovial fluid, likely component of early prepatellar/infrapatellar bursitis but no focal fluid collection to I+D, Unlikely to require surgical intervention based on current presentation

## 2020-07-11 NOTE — H&P ADULT - PROBLEM SELECTOR PLAN 4
continue home medications atrovastatin 20mg QD, fenofibrate 130mg QD continue home medications metoprolol 25mg QD  after confirming with wife  - DASH diet continue home medications tamsulosin 0.4mg QD

## 2020-07-11 NOTE — H&P ADULT - ASSESSMENT
81 yo male PMhx 79 yo male is admitted for cellulitis 79 yo male with PMhx BPH, HTN, HLD presents to ED with left knee pain. Admitted for cellulitis of left leg 81 yo male with PMhx BPH, HTN, HLD, colon cancer 4 years ago, presents to ED with left knee pain. Admitted for cellulitis of left leg

## 2020-07-11 NOTE — H&P ADULT - PROBLEM SELECTOR PLAN 1
- pt left lower extremity erythematous well demarcated, warm, swollen  - abx for cellulitis ordered, vanco 1g Q12, zosyn 3.375g Q8  - f/u vanco troughs Q12     - ortho consulted, will f/u, continue ortho recs, Low suspicion for joint involvement, minimal effusion. Knee aspirated with 10cc of straw colored normal synovial fluid,   - Trend ESR/CRP  - FU knee synovial results  - ICE and elevation leg - pt left lower extremity erythematous well demarcated, warm, swollen  - abx for cellulitis started, vanco 1g Q12, zosyn 3.375g Q8, pending further workup  - f/u vanco troughs pre 4th dose, adjust as necessary  - f/u blood cx  - ortho consulted, will f/u, continue ortho recs, Low suspicion for joint involvement, minimal effusion. Knee aspirated with 10cc of straw colored normal synovial fluid,   - Trend ESR/CRP  - FU knee synovial results  - ICE and elevation leg - pt left lower extremity erythematous well demarcated, warm, swollen  - s/p in ED vanco 1g, zosyn 3.375g  - continuing vanco 1g Q12, zosyn 3.375g Q8  - f/u vanco troughs pre 4th dose, adjust as necessary  - f/u blood cx  - ortho consulted, Low suspicion for joint involvement. Knee aspirated with 10cc of straw colored normal synovial fluid, f/u knee synovial results  - Trend ESR/CRP  - ICE and elevation leg

## 2020-07-11 NOTE — ED ADULT NURSE NOTE - OBJECTIVE STATEMENT
received pt in bed #13b Pt A&O c/o left knee pain w/ redness, warmth & swelling since thursday Pt states he was kneeling weeding in the garden on thursday.

## 2020-07-11 NOTE — H&P ADULT - NSHPPHYSICALEXAM_GEN_ALL_CORE
T(C): 36.8 (07-11-20 @ 18:20), Max: 37 (07-11-20 @ 14:37)  HR: 73 (07-11-20 @ 18:20) (73 - 82)  BP: 126/72 (07-11-20 @ 18:20) (126/72 - 145/82)  RR: 16 (07-11-20 @ 18:20) (16 - 18)  SpO2: 96% (07-11-20 @ 18:20) (96% - 98%)    GENERAL: patient appears well, no acute distress, conversant  EYES: anicteric sclerae, no exudates  ENMT: oropharynx clear without erythema, no exudates, moist mucous membranes  NECK: supple, soft, no thyromegaly noted  LUNGS: clear to auscultation, no intercostal retractions  HEART: S1/S2, regular rate and rhythm, no murmurs noted, no lower extremity edema  GASTROINTESTINAL: abdomen is soft, nontender, nondistended, normoactive bowel sounds, no palpable masses  INTEGUMENT: good skin turgor, warm skin, appears well perfused  MUSCULOSKELETAL: no clubbing or cyanosis, no obvious deformity, left leg erythematous from ankle to just above left knee, well demarcated, left leg is warm, unable to move leg 2/2 pain, +3 pulses b/l  NEUROLOGIC: awake, alert, oriented x3, good muscle tone in 4 extremities, no obvious sensory deficits  PSYCHIATRIC: mood is good, affect is congruent, linear and logical thought process  HEME/LYMPH: no palpable supraclavicular nodules, no obvious ecchymosis or petechiae T(C): 36.8 (07-11-20 @ 18:20), Max: 37 (07-11-20 @ 14:37)  HR: 73 (07-11-20 @ 18:20) (73 - 82)  BP: 126/72 (07-11-20 @ 18:20) (126/72 - 145/82)  RR: 16 (07-11-20 @ 18:20) (16 - 18)  SpO2: 96% (07-11-20 @ 18:20) (96% - 98%)    GENERAL: patient appears well, no acute distress, conversant  EYES: anicteric sclerae, no exudates  ENMT: oropharynx clear without erythema, no exudates, moist mucous membranes  NECK: supple, soft, no thyromegaly noted  LUNGS: clear to auscultation, no intercostal retractions  HEART: S1/S2, regular rate and rhythm, no murmurs noted, no lower extremity edema  GASTROINTESTINAL: abdomen is soft, nontender, nondistended, normoactive bowel sounds, no palpable masses  INTEGUMENT: good skin turgor, warm skin, appears well perfused  MUSCULOSKELETAL: no clubbing or cyanosis, no obvious deformity, left leg erythematous from ankle to just above left knee, well demarcated, left leg is warm, unable to move leg 2/2 pain, +2 pulses b/l, negative hommans sign  NEUROLOGIC: awake, alert, oriented x3, good muscle tone in 4 extremities, no obvious sensory deficits  PSYCHIATRIC: mood is good, affect is congruent, linear and logical thought process  HEME/LYMPH: no palpable supraclavicular nodules, no obvious ecchymosis or petechiae

## 2020-07-11 NOTE — H&P ADULT - PROBLEM SELECTOR PLAN 6
- lovenox 40mg QD  - IMPROVE VTE Individual Risk Assessment          RISK                                                          Points  [  ] Previous VTE                                                3  [  ] Thrombophilia                                             2  [  ] Lower limb paralysis                                   2        (unable to hold up >15 seconds)    [  ] Current Cancer                                             2         (within 6 months)  [  ] Immobilization > 24 hrs                              1  [  ] ICU/CCU stay > 24 hours                             1  [ x ] Age > 60                                                         1    IMPROVE VTE Score: 1 - pt developed anxiety after colon cancer dx  - continue home medications alprazolam 0.25mg PRN after confirming with wife - pt developed anxiety after colon cancer dx (per daughter)   - pt has home medications alprazolam 0.25mg PRN  - may consider home medications if necessary

## 2020-07-11 NOTE — ED PROVIDER NOTE - ATTENDING CONTRIBUTION TO CARE
I have personally performed a face to face diagnostic evaluation on this patient.  I have reviewed the PA note and agree with the history, exam, and plan of care, except as noted.     80 M with left knee and lower leg erythema with reported trauma without injury and recent gardening- no laceration or injury. Unable to bend knee.    On exam:  Gen: comfortable, no acute distress  Msk: diffuse swelling and tenderness of left knee with erythema extending down to lower shin.    Concern for septic arthritis with overlying cellulitis, r/o DVT.

## 2020-07-11 NOTE — ED ADULT NURSE NOTE - PMH
BPH without urinary obstruction    History of carpal tunnel release    Hyperlipidemia    Hypertension

## 2020-07-11 NOTE — H&P ADULT - PROBLEM SELECTOR PLAN 5
- lovenox 40mg QD  - IMPROVE VTE Individual Risk Assessment          RISK                                                          Points  [  ] Previous VTE                                                3  [  ] Thrombophilia                                             2  [  ] Lower limb paralysis                                   2        (unable to hold up >15 seconds)    [  ] Current Cancer                                             2         (within 6 months)  [  ] Immobilization > 24 hrs                              1  [  ] ICU/CCU stay > 24 hours                             1  [ x ] Age > 60                                                         1    IMPROVE VTE Score: 1 - pt developed anxiety after colon cancer dx (as per courtney)  - continue home medications alprazolam 0.25mg PRN continue home medications atrovastatin 20mg QD, fenofibrate 130mg QD  after confirming with wife continue home medications atorvastatin 20mg QD, fenofibrate 130mg QD  after confirming with wife continue home medications atorvastatin 20mg QD, fenofibrate 130mg QD

## 2020-07-11 NOTE — H&P ADULT - NSHPREVIEWOFSYSTEMS_GEN_ALL_CORE
CONSTITUTIONAL: denies fever, chills, fatigue, weakness  HEENT: denies blurred vision, sore throat  SKIN: denies new lesions, rash  CARDIOVASCULAR: denies chest pain, chest pressure, palpitations  RESPIRATORY: denies shortness of breath, sputum production  GASTROINTESTINAL: denies nausea, vomiting, diarrhea, abdominal pain  GENITOURINARY: denies dysuria, discharge  NEUROLOGICAL: denies numbness, headache, focal weakness  MUSCULOSKELETAL: denies new joint pain, muscle aches  HEMATOLOGIC: denies gross bleeding, bruising  LYMPHATICS: denies enlarged lymph nodes  PSYCHIATRIC: denies recent changes in anxiety, depression  ENDOCRINOLOGIC: denies sweating, cold or heat intolerance CONSTITUTIONAL: denies fever, chills, fatigue, weakness  HEENT: denies blurred vision, sore throat  SKIN: denies new lesions, rash  CARDIOVASCULAR: denies chest pain, chest pressure, palpitations  RESPIRATORY: denies shortness of breath, sputum production  GASTROINTESTINAL: denies nausea, vomiting, diarrhea, abdominal pain  GENITOURINARY: denies dysuria, discharge  NEUROLOGICAL: denies numbness, headache, focal weakness  MUSCULOSKELETAL: unable to move left knee 2/2 pain  HEMATOLOGIC: denies gross bleeding, bruising  LYMPHATICS: denies enlarged lymph nodes  PSYCHIATRIC: denies recent changes in anxiety, depression  ENDOCRINOLOGIC: denies sweating, cold or heat intolerance

## 2020-07-11 NOTE — ED PROVIDER NOTE - OBJECTIVE STATEMENT
Pt is a 81 yo male with pmhx of BPH without urinary obstruction Hyperlipidemia Hypertension here for left leg swelling redness. Pt states he hit his left knee getting out of boat one week ago no swelling or redness immediately pt admits to he was gardening a few days ago and leaning on knee and developed pain redness swelling 2 days ago getting worse unable to walk denies any laceration fever chills chest pain sob. denies hx of gout    pcp Didi

## 2020-07-11 NOTE — H&P ADULT - PROBLEM SELECTOR PLAN 2
continue home medications tamsulosin 0.4mg QD pt pharmacy [facility] in [place], currently closed  - wife not answering calls, daughter unsure of medications  - f/u with wife in AM for confirmed medication list pt pharmacy [facility] in [place], currently closed  confirmed medication with wife. - chronic HTN pt, well controlled on admission 118/66,   - takes home medications metoprolol 25mg QD  - to confirm medications in AM, continue metoprolol 25mg QD with hold parameters while inpt  - DASH diet

## 2020-07-11 NOTE — H&P ADULT - NSHPSOCIALHISTORY_GEN_ALL_CORE
Patient denies smoking and drug use, admits to drinking a beer with lunch and dinner. He lives with his wife of 59 years, is very active around the house without needing any help.

## 2020-07-11 NOTE — ED PROVIDER NOTE - CLINICAL SUMMARY MEDICAL DECISION MAKING FREE TEXT BOX
Pt is a 79 yo male with left knee pain swelling redness will get labs us and doppler fluids pain control

## 2020-07-11 NOTE — H&P ADULT - PROBLEM SELECTOR PLAN 3
continue home medications metoprolol 25mg QD continue home medications metoprolol 25mg QD  - DASH diet continue home medications tamsulosin 0.4mg QD  after confirming with wife pt pharmacy [facility] in [place], currently closed  attempted to confirm medication with wife, she didn't answer the phone, incomplete medication reconciliation, primary team to complete in AM

## 2020-07-11 NOTE — H&P ADULT - NSICDXPASTMEDICALHX_GEN_ALL_CORE_FT
PAST MEDICAL HISTORY:  BPH without urinary obstruction     History of carpal tunnel release     Hyperlipidemia     Hypertension     Prostate cancer PAST MEDICAL HISTORY:  BPH without urinary obstruction     Colon cancer stage 1, removed cecum 4 years ago    History of carpal tunnel release     Hyperlipidemia     Hypertension

## 2020-07-12 DIAGNOSIS — M11.20 OTHER CHONDROCALCINOSIS, UNSPECIFIED SITE: ICD-10-CM

## 2020-07-12 LAB
ALBUMIN SERPL ELPH-MCNC: 2.7 G/DL — LOW (ref 3.3–5)
ALP SERPL-CCNC: 44 U/L — SIGNIFICANT CHANGE UP (ref 40–120)
ALT FLD-CCNC: 24 U/L — SIGNIFICANT CHANGE UP (ref 12–78)
ANION GAP SERPL CALC-SCNC: 9 MMOL/L — SIGNIFICANT CHANGE UP (ref 5–17)
AST SERPL-CCNC: 17 U/L — SIGNIFICANT CHANGE UP (ref 15–37)
BASOPHILS # BLD AUTO: 0.01 K/UL — SIGNIFICANT CHANGE UP (ref 0–0.2)
BASOPHILS NFR BLD AUTO: 0.1 % — SIGNIFICANT CHANGE UP (ref 0–2)
BILIRUB SERPL-MCNC: 0.7 MG/DL — SIGNIFICANT CHANGE UP (ref 0.2–1.2)
BUN SERPL-MCNC: 20 MG/DL — SIGNIFICANT CHANGE UP (ref 7–23)
CALCIUM SERPL-MCNC: 8.1 MG/DL — LOW (ref 8.5–10.1)
CHLORIDE SERPL-SCNC: 112 MMOL/L — HIGH (ref 96–108)
CO2 SERPL-SCNC: 21 MMOL/L — LOW (ref 22–31)
CREAT SERPL-MCNC: 1 MG/DL — SIGNIFICANT CHANGE UP (ref 0.5–1.3)
CRP SERPL-MCNC: 12.15 MG/DL — HIGH (ref 0–0.4)
EOSINOPHIL # BLD AUTO: 0.03 K/UL — SIGNIFICANT CHANGE UP (ref 0–0.5)
EOSINOPHIL NFR BLD AUTO: 0.3 % — SIGNIFICANT CHANGE UP (ref 0–6)
ERYTHROCYTE [SEDIMENTATION RATE] IN BLOOD: 73 MM/HR — HIGH (ref 0–20)
GLUCOSE SERPL-MCNC: 104 MG/DL — HIGH (ref 70–99)
HCT VFR BLD CALC: 35.1 % — LOW (ref 39–50)
HGB BLD-MCNC: 12 G/DL — LOW (ref 13–17)
IMM GRANULOCYTES NFR BLD AUTO: 0.3 % — SIGNIFICANT CHANGE UP (ref 0–1.5)
LYMPHOCYTES # BLD AUTO: 1.06 K/UL — SIGNIFICANT CHANGE UP (ref 1–3.3)
LYMPHOCYTES # BLD AUTO: 10.9 % — LOW (ref 13–44)
MCHC RBC-ENTMCNC: 31 PG — SIGNIFICANT CHANGE UP (ref 27–34)
MCHC RBC-ENTMCNC: 34.2 GM/DL — SIGNIFICANT CHANGE UP (ref 32–36)
MCV RBC AUTO: 90.7 FL — SIGNIFICANT CHANGE UP (ref 80–100)
MONOCYTES # BLD AUTO: 1.13 K/UL — HIGH (ref 0–0.9)
MONOCYTES NFR BLD AUTO: 11.6 % — SIGNIFICANT CHANGE UP (ref 2–14)
NEUTROPHILS # BLD AUTO: 7.46 K/UL — HIGH (ref 1.8–7.4)
NEUTROPHILS NFR BLD AUTO: 76.8 % — SIGNIFICANT CHANGE UP (ref 43–77)
NRBC # BLD: 0 /100 WBCS — SIGNIFICANT CHANGE UP (ref 0–0)
PLATELET # BLD AUTO: 224 K/UL — SIGNIFICANT CHANGE UP (ref 150–400)
POTASSIUM SERPL-MCNC: 3.8 MMOL/L — SIGNIFICANT CHANGE UP (ref 3.5–5.3)
POTASSIUM SERPL-SCNC: 3.8 MMOL/L — SIGNIFICANT CHANGE UP (ref 3.5–5.3)
PROT SERPL-MCNC: 6.6 G/DL — SIGNIFICANT CHANGE UP (ref 6–8.3)
RBC # BLD: 3.87 M/UL — LOW (ref 4.2–5.8)
RBC # FLD: 14.3 % — SIGNIFICANT CHANGE UP (ref 10.3–14.5)
SARS-COV-2 IGG SERPL QL IA: NEGATIVE — SIGNIFICANT CHANGE UP
SARS-COV-2 IGM SERPL IA-ACNC: 0.09 INDEX — SIGNIFICANT CHANGE UP
SODIUM SERPL-SCNC: 142 MMOL/L — SIGNIFICANT CHANGE UP (ref 135–145)
WBC # BLD: 9.72 K/UL — SIGNIFICANT CHANGE UP (ref 3.8–10.5)
WBC # FLD AUTO: 9.72 K/UL — SIGNIFICANT CHANGE UP (ref 3.8–10.5)

## 2020-07-12 PROCEDURE — 99233 SBSQ HOSP IP/OBS HIGH 50: CPT | Mod: GC

## 2020-07-12 PROCEDURE — 99232 SBSQ HOSP IP/OBS MODERATE 35: CPT | Mod: GC

## 2020-07-12 PROCEDURE — 99223 1ST HOSP IP/OBS HIGH 75: CPT

## 2020-07-12 RX ORDER — ALPRAZOLAM 0.25 MG
1 TABLET ORAL
Qty: 0 | Refills: 0 | DISCHARGE

## 2020-07-12 RX ORDER — FENOFIBRATE,MICRONIZED 130 MG
1 CAPSULE ORAL
Qty: 0 | Refills: 0 | DISCHARGE

## 2020-07-12 RX ORDER — COLCHICINE 0.6 MG
1.2 TABLET ORAL ONCE
Refills: 0 | Status: COMPLETED | OUTPATIENT
Start: 2020-07-12 | End: 2020-07-12

## 2020-07-12 RX ORDER — TAMSULOSIN HYDROCHLORIDE 0.4 MG/1
1 CAPSULE ORAL
Qty: 0 | Refills: 0 | DISCHARGE

## 2020-07-12 RX ORDER — ASPIRIN/CALCIUM CARB/MAGNESIUM 324 MG
1 TABLET ORAL
Qty: 0 | Refills: 0 | DISCHARGE

## 2020-07-12 RX ORDER — MORPHINE SULFATE 50 MG/1
0.5 CAPSULE, EXTENDED RELEASE ORAL EVERY 4 HOURS
Refills: 0 | Status: DISCONTINUED | OUTPATIENT
Start: 2020-07-12 | End: 2020-07-14

## 2020-07-12 RX ORDER — ALPRAZOLAM 0.25 MG
0.25 TABLET ORAL
Refills: 0 | Status: DISCONTINUED | OUTPATIENT
Start: 2020-07-12 | End: 2020-07-14

## 2020-07-12 RX ORDER — OXYCODONE AND ACETAMINOPHEN 5; 325 MG/1; MG/1
2 TABLET ORAL EVERY 6 HOURS
Refills: 0 | Status: DISCONTINUED | OUTPATIENT
Start: 2020-07-12 | End: 2020-07-12

## 2020-07-12 RX ORDER — FINASTERIDE 5 MG/1
5 TABLET, FILM COATED ORAL DAILY
Refills: 0 | Status: DISCONTINUED | OUTPATIENT
Start: 2020-07-12 | End: 2020-07-14

## 2020-07-12 RX ORDER — METOPROLOL TARTRATE 50 MG
1 TABLET ORAL
Qty: 0 | Refills: 0 | DISCHARGE

## 2020-07-12 RX ORDER — ATORVASTATIN CALCIUM 80 MG/1
1 TABLET, FILM COATED ORAL
Qty: 0 | Refills: 0 | DISCHARGE

## 2020-07-12 RX ORDER — FINASTERIDE 5 MG/1
1 TABLET, FILM COATED ORAL
Qty: 0 | Refills: 0 | DISCHARGE

## 2020-07-12 RX ORDER — ENOXAPARIN SODIUM 100 MG/ML
40 INJECTION SUBCUTANEOUS DAILY
Refills: 0 | Status: DISCONTINUED | OUTPATIENT
Start: 2020-07-12 | End: 2020-07-14

## 2020-07-12 RX ORDER — MORPHINE SULFATE 50 MG/1
1 CAPSULE, EXTENDED RELEASE ORAL EVERY 4 HOURS
Refills: 0 | Status: DISCONTINUED | OUTPATIENT
Start: 2020-07-12 | End: 2020-07-14

## 2020-07-12 RX ADMIN — Medication 1.2 MILLIGRAM(S): at 12:37

## 2020-07-12 RX ADMIN — MORPHINE SULFATE 1 MILLIGRAM(S): 50 CAPSULE, EXTENDED RELEASE ORAL at 18:23

## 2020-07-12 RX ADMIN — ENOXAPARIN SODIUM 40 MILLIGRAM(S): 100 INJECTION SUBCUTANEOUS at 12:19

## 2020-07-12 NOTE — PROGRESS NOTE ADULT - PROBLEM SELECTOR PLAN 7
- pt developed anxiety after colon cancer dx (per daughter)   - pt has home medications alprazolam 0.25mg PRN  - may consider home medications if necessary - pt developed anxiety after colon cancer dx (per daughter)   - pt has home medications alprazolam 0.25mg PRN, verified with pharmacy and [facility], will order as prescribed

## 2020-07-12 NOTE — CONSULT NOTE ADULT - SUBJECTIVE AND OBJECTIVE BOX
[facility]  Division of Infectious Diseases  [phone]    LAURITA YOUNG  80y, Male  [id number]    HPI--  80M with PMG prostate cancer, colon cancer, HTN, dyslipidemia presents with acute onset of L knee pain beginning 7/9. Patient states he had been [facility] visiting his son for about 2 weeks, drove home 360 miles without difficulty. Carlos Alberto bustillos had done a lot of gardening, and upon returning to  did gardening as well. Spent a lot of time on his knees but denies any definite trauma. Denies any tick exposure, insect bites, or rash. Pain progressed to the point where he was only able to walk wiht difficulty. No fevers, chills, or rigors. No similar episodes.    Patient seen here by orthopedics. Arthrocentisis with scant fluid aspirated. Per orthopedics not thought to represent prepatellar bursitis. Cell counts moderately elevated, gram stain negative. Duplex US wihtout DVT.     Patient has been given broad spectrum antibiotics. Other than pain in his knee, no complaints at this time.      PMH/PSH--  Colon cancer  Prostate cancer  History of carpal tunnel release  BPH without urinary obstruction  Hyperlipidemia  Hypertension  History of carpal tunnel release      Allergies--  No Known Allergies      Medications--  Antibiotics: piperacillin/tazobactam IVPB.. 3.375 Gram(s) IV Intermittent every 8 hours  vancomycin  IVPB 1000 milliGRAM(s) IV Intermittent every 12 hours    Immunologic:   Other: acetaminophen   Tablet .. PRN  aspirin enteric coated  atorvastatin  enoxaparin Injectable  fenofibrate Tablet  metoprolol succinate ER  oxycodone    5 mG/acetaminophen 325 mG PRN  tamsulosin    Antimicrobials last 90 days per EMR: MEDICATIONS  (STANDING):  piperacillin/tazobactam IVPB..   25 mL/Hr IV Intermittent (07-12-20 @ 05:52)   25 mL/Hr IV Intermittent (07-11-20 @ 22:16)    piperacillin/tazobactam IVPB...   200 mL/Hr IV Intermittent (07-11-20 @ 18:01)    vancomycin  IVPB   250 mL/Hr IV Intermittent (07-11-20 @ 18:36)    vancomycin  IVPB   250 mL/Hr IV Intermittent (07-12-20 @ 05:52)        Social History--  EtOH: ~daily beer ingestion  Tobacco: denies   Drug Use: denies     Family/Marital History--    No pertinent family history in first degree relatives        Travel/Environmental/Occupational History:  As above  Used to work for [employer]. 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     Review of Systems:  A >=10-point review of systems was obtained.   Review of systems otherwise negative except as previously noted.    Physical Exam--  Vital Signs: T(F): 98 (07-12-20 @ 04:48), Max: 99 (07-11-20 @ 21:21)  HR: 66 (07-12-20 @ 04:48)  BP: 98/67 (07-12-20 @ 04:48)  RR: 19 (07-12-20 @ 04:48)  SpO2: 95% (07-12-20 @ 04:48)  Wt(kg): --  General: Nontoxic-appearing Male in no acute distress.  HEENT: AT/NC.. Anicteric. Conjunctiva pink and moist. Oropharynx clear..  Neck: Not rigid. No sense of mass.  Nodes: None palpable.  Lungs: Clear bilaterally without rales, wheezing or rhonchi  Heart: Regular rate and rhythm. No Murmur. No rub. No gallop. No palpable thrill.  Abdomen: Bowel sounds present and normoactive. Soft. Nondistended. Nontender. No sense of mass. No organomegaly.  Back: No spinal tenderness. No costovertebral angle tenderness.   Extremities: No cyanosis or clubbing.  L knee modest edema, no discrete sense of fluid collection. Mild PROMT. +Calor. +Tender. +Erythema, blanching. No anesthesia or bullae.   Skin: Warm. Dry. Good turgor. No rash. No vasculitic stigmata.  Psychiatric: Appropriate affect and mood for situation.         Laboratory & Imaging Data--  CBC                        12.0   9.72  )-----------( 224      ( 12 Jul 2020 06:12 )             35.1     WBC Count: 12.84 K/uL (07-11-20 @ 15:24)      Chemistries  07-12    142  |  112<H>  |  20  ----------------------------<  104<H>  3.8   |  21<L>  |  1.00    Ca    8.1<L>      12 Jul 2020 06:12    TPro  6.6  /  Alb  2.7<L>  /  TBili  0.7  /  DBili  x   /  AST  17  /  ALT  24  /  AlkPhos  44  07-12    Crystals, Synovial Fluid (07.11.20 @ 20:21)    Synovial Crystals Clarity: Slightly Turbid    Synovial Crystals Tube: Red Top Tube    Synovial Crystals Color: Yellow    Synovial Crystals Id: Crystals Present Extracellular CPPD (calcium pyrophosphate) present.    Cell Count, Body Fluid (07.11.20 @ 20:21)    Monocyte/Macrophage Count - Body Fluid: 37 %    Fluid Segmented Granulocytes: 50 %    Fluid Type: Synovial Fluid    Body Fluid Appearance: Slightly Cloudy    BF Lymphocytes: 13 %    Tube Type: Lavendar    Color - Body Fluid: Yellow    Total Nucleated Cell Count, Body Fluid: 3501: Peritoneal/Pleural/ Pericardial  Body Fluid Types            Total Nucleated cells: <500/uL            Neutrophils: <25%          Synovial Body Fluid Type           Total Nucleated cells: <150/uL           Neutrophils: <25%           Lymphocytes: <75%           Moncytes/Macrophages: </=70% /uL    Total RBC Count: 1000 /uL    Culture Data  Culture - Body Fluid with Gram Stain (collected 11 Jul 2020 22:03)  Source: .Body Fluid Synovial Fluid  Gram Stain (11 Jul 2020 23:51):    polymorphonuclear leukocytes seen    No organisms seen    by cytocentrifuge    < from: Xray Knee 3 Views, Left (07.11.20 @ 15:38) >    INTERPRETATION:  Pain swelling left knee.  3 views left knee.  No fracture dislocation or focal bone destruction. Mild osteoarthritic change noted. Suprapatellar effusion.    Impression: Mild osteoarthritic change. Suprapatellar effusion.    < end of copied text >
80y Male presents to  ED with left leg pain and swelling since wednesday. Notes he fell and twisted his knee last sunday, had no pain and was able to walk. Did some gardening on wednesday with alot of kneeling and that night began with knee pain. No fevers. Able to limp around. No other major complaints. No paresthesias. No back pain.     PAST MEDICAL & SURGICAL HISTORY:  History of carpal tunnel release  BPH without urinary obstruction  Hyperlipidemia  Hypertension  History of carpal tunnel release: Right carpal tunnel release x18 years ago    MEDICATIONS  (STANDING):  lactated ringers. 1000 milliLiter(s) (75 mL/Hr) IV Continuous <Continuous>  piperacillin/tazobactam IVPB... 3.375 Gram(s) IV Intermittent once  vancomycin  IVPB 1000 milliGRAM(s) IV Intermittent once    MEDICATIONS  (PRN):    Allergies    No Known Allergies    Intolerances        T(C): 37 (07-11-20 @ 14:37), Max: 37 (07-11-20 @ 14:37)  HR: 82 (07-11-20 @ 14:37) (82 - 82)  BP: 145/82 (07-11-20 @ 14:37) (145/82 - 145/82)  RR: 18 (07-11-20 @ 14:37) (18 - 18)  SpO2: 98% (07-11-20 @ 14:37) (98% - 98%)  Wt(kg): --    PE   LLE:  Small skin lesion over anterolateral shin without drainage.   Erythema diffusely about lower extremity from ankle to inferior knee that wraps around anterolateral shin/knee about lateral joint line.   Small effusion.   Subcutaneous swelling about infrapatellar bursa without focal fluid collection  ROM0-45, no pain with micromotion. Pain after 45 degrees of flexion.   Able to SLR; - Log Roll/Heel Strike  Motor intact GS/TA/FHL/EHL  SILT L2-S1  DP/PT pulses 2+  Calf soft and compressible  No ankle effusion  No hip pain. Full AROM of hip without pain    RLE/BUE:   No bony TTP; Good ROM w/o pain; Exam Unremarkable    Imaging:  XR demonstrating L knee osteoarthritis, no obvious fracture, small effusion.     L knee aspiration: in sterile fashion using superomedial approach to avoid any cellulitic skin, aspirated 10cc of straw colored normal synovial fluid.     80M with LLE cellulitis and early prepatellar bursitis    -Low suspicion for joint involvement, minimal effusion. Knee aspirated with 10cc of straw colored normal synovial fluid, will FU on results.   -Needs iv abx for cellulitis  -likely component of early prepatellar/infrapatellar bursitis but no focal fluid collection to I+D  -Will follow  -Unlikely to require surgical intervention based on current presentation  -Trends ESR/CRP  -Broad spectrum abx  -FU knee synovial results  -Medical management  -ICE and elevation leg  -DW Dr. Pop, agrees with above.

## 2020-07-12 NOTE — PROGRESS NOTE ADULT - SUBJECTIVE AND OBJECTIVE BOX
Patient is a 80y old  Male who presents with a chief complaint of cellulitis (12 Jul 2020 08:59)      INTERVAL HPI/OVERNIGHT EVENTS: Pt was seen and examined at the bedside this morning. He states that he continues to have left knee pain, especially with ROM. He denies fever,   He denies having fever/chills/headache/chest pain or palpitations/ SOB/ abd pan/diarrhea/numbness or tingling in the left or right leg.     MEDICATIONS  (STANDING):  aspirin enteric coated 81 milliGRAM(s) Oral daily  atorvastatin 20 milliGRAM(s) Oral at bedtime  colchicine 1.2 milliGRAM(s) Oral once  enoxaparin Injectable 40 milliGRAM(s) SubCutaneous daily  fenofibrate Tablet 145 milliGRAM(s) Oral daily  metoprolol succinate ER 25 milliGRAM(s) Oral daily  piperacillin/tazobactam IVPB.. 3.375 Gram(s) IV Intermittent every 8 hours  tamsulosin 0.4 milliGRAM(s) Oral at bedtime  vancomycin  IVPB 1000 milliGRAM(s) IV Intermittent every 12 hours    MEDICATIONS  (PRN):  acetaminophen   Tablet .. 650 milliGRAM(s) Oral every 6 hours PRN Mild Pain (1 - 3)  oxycodone    5 mG/acetaminophen 325 mG 1 Tablet(s) Oral every 6 hours PRN Moderate Pain (4 - 6)      Allergies    No Known Allergies    Intolerances        REVIEW OF SYSTEMS:  CONSTITUTIONAL: No fever.  HEENT: No headache  RESPIRATORY: No cough or shortness of breath  CARDIOVASCULAR: No chest pain  GASTROINTESTINAL: No abd pain, nausea, vomiting, or diarrhea  NEUROLOGICAL: No focal weakness or dizziness  MUSCULOSKELETAL: No myalgias     Vital Signs Last 24 Hrs  T(C): 36.7 (12 Jul 2020 04:48), Max: 37.2 (11 Jul 2020 21:21)  T(F): 98 (12 Jul 2020 04:48), Max: 99 (11 Jul 2020 21:21)  HR: 66 (12 Jul 2020 04:48) (66 - 82)  BP: 98/67 (12 Jul 2020 04:48) (98/67 - 145/82)  BP(mean): --  RR: 19 (12 Jul 2020 04:48) (16 - 19)  SpO2: 95% (12 Jul 2020 04:48) (95% - 98%)    PHYSICAL EXAM:  GENERAL: NAD, awake and alert.  HEENT:  anicteric, moist mucous membranes  CHEST/LUNG:  CTA b/l, no rales, wheezes, or rhonchi  HEART:  RRR, S1, S2  ABDOMEN: soft, nontender, nondistended  EXTREMITIES:   LLE: Knee swelling and erythema present, skin intact, warm to touch. ROM painful in F/Ext knee, SILT L2-S1, Motor intact, + DP pulse, Compartments soft and compressible. Calf NTTP.  RLE/RUE/LUE no lesions/swelling/gross deformities appreciated.   NERVOUS SYSTEM: answers questions and follows commands appropriately    LABS:                        12.0   9.72  )-----------( 224      ( 12 Jul 2020 06:12 )             35.1     CBC Full  -  ( 12 Jul 2020 06:12 )  WBC Count : 9.72 K/uL  Hemoglobin : 12.0 g/dL  Hematocrit : 35.1 %  Platelet Count - Automated : 224 K/uL  Mean Cell Volume : 90.7 fl  Mean Cell Hemoglobin : 31.0 pg  Mean Cell Hemoglobin Concentration : 34.2 gm/dL  Auto Neutrophil # : 7.46 K/uL  Auto Lymphocyte # : 1.06 K/uL  Auto Monocyte # : 1.13 K/uL  Auto Eosinophil # : 0.03 K/uL  Auto Basophil # : 0.01 K/uL  Auto Neutrophil % : 76.8 %  Auto Lymphocyte % : 10.9 %  Auto Monocyte % : 11.6 %  Auto Eosinophil % : 0.3 %  Auto Basophil % : 0.1 %    12 Jul 2020 06:12    142    |  112    |  20     ----------------------------<  104    3.8     |  21     |  1.00     Ca    8.1        12 Jul 2020 06:12    TPro  6.6    /  Alb  2.7    /  TBili  0.7    /  DBili  x      /  AST  17     /  ALT  24     /  AlkPhos  44     12 Jul 2020 06:12        CAPILLARY BLOOD GLUCOSE            Culture - Body Fluid with Gram Stain (collected 07-11-20 @ 22:03)  Source: .Body Fluid Synovial Fluid  Gram Stain (07-11-20 @ 23:51):    polymorphonuclear leukocytes seen    No organisms seen    by cytocentrifuge        RADIOLOGY & ADDITIONAL TESTS:    Personally reviewed.     Consultant(s) Notes Reviewed:  [x] YES  [ ] NO Patient is a 80y old  Male who presents with a chief complaint of cellulitis (12 Jul 2020 08:59)    . Admitted for cellulitis of left leg / lt knee swelling .  INTERVAL HPI/OVERNIGHT EVENTS: Pt was seen and examined at the bedside this morning.  pt  states that he continues to have left knee pain, especially with ROM.  pt denies fever,   He denies having fever/chills/headache/chest pain or palpitations/ SOB/ abd pan/diarrhea/numbness or tingling in the left or right leg.       REVIEW OF SYSTEMS:  CONSTITUTIONAL: No fever.  HEENT: No headache  RESPIRATORY: No cough or shortness of breath  CARDIOVASCULAR: No chest pain  GASTROINTESTINAL: No abd pain, nausea, vomiting, or diarrhea  NEUROLOGICAL: No focal weakness or dizziness  MUSCULOSKELETAL: No myalgias     Vital Signs Last 24 Hrs  T(C): 36.7 (12 Jul 2020 04:48), Max: 37.2 (11 Jul 2020 21:21)  T(F): 98 (12 Jul 2020 04:48), Max: 99 (11 Jul 2020 21:21)  HR: 66 (12 Jul 2020 04:48) (66 - 82)  BP: 98/67 (12 Jul 2020 04:48) (98/67 - 145/82)  BP(mean): --  RR: 19 (12 Jul 2020 04:48) (16 - 19)  SpO2: 95% (12 Jul 2020 04:48) (95% - 98%)    PHYSICAL EXAM:  GENERAL: NAD, awake and alert.  HEENT:  anicteric, moist mucous membranes  CHEST/LUNG:  CTA b/l, no rales, wheezes, or rhonchi  HEART:  RRR, S1, S2  ABDOMEN: soft, nontender, nondistended  EXTREMITIES:   LLE: Knee swelling and erythema present, skin intact, warm to touch. ROM painful in F/Ext knee, SILT L2-S1, Motor intact, + DP pulse, Compartments soft and compressible. Calf NTTP.  RLE/RUE/LUE no lesions/swelling/gross deformities appreciated.   NERVOUS SYSTEM: answers questions and follows commands appropriately    MEDICATIONS  (STANDING):  aspirin enteric coated 81 milliGRAM(s) Oral daily  atorvastatin 20 milliGRAM(s) Oral at bedtime  colchicine 1.2 milliGRAM(s) Oral once  enoxaparin Injectable 40 milliGRAM(s) SubCutaneous daily  fenofibrate Tablet 145 milliGRAM(s) Oral daily  metoprolol succinate ER 25 milliGRAM(s) Oral daily  piperacillin/tazobactam IVPB.. 3.375 Gram(s) IV Intermittent every 8 hours  tamsulosin 0.4 milliGRAM(s) Oral at bedtime  vancomycin  IVPB 1000 milliGRAM(s) IV Intermittent every 12 hours    MEDICATIONS  (PRN):  acetaminophen   Tablet .. 650 milliGRAM(s) Oral every 6 hours PRN Mild Pain (1 - 3)  oxycodone    5 mG/acetaminophen 325 mG 1 Tablet(s) Oral every 6 hours PRN Moderate Pain (4 - 6)      LABS:                        12.0   9.72  )-----------( 224      ( 12 Jul 2020 06:12 )             35.1     CBC Full  -  ( 12 Jul 2020 06:12 )  WBC Count : 9.72 K/uL  Hemoglobin : 12.0 g/dL  Hematocrit : 35.1 %  Platelet Count - Automated : 224 K/uL  Mean Cell Volume : 90.7 fl  Mean Cell Hemoglobin : 31.0 pg  Mean Cell Hemoglobin Concentration : 34.2 gm/dL  Auto Neutrophil # : 7.46 K/uL  Auto Lymphocyte # : 1.06 K/uL  Auto Monocyte # : 1.13 K/uL  Auto Eosinophil # : 0.03 K/uL  Auto Basophil # : 0.01 K/uL  Auto Neutrophil % : 76.8 %  Auto Lymphocyte % : 10.9 %  Auto Monocyte % : 11.6 %  Auto Eosinophil % : 0.3 %  Auto Basophil % : 0.1 %    12 Jul 2020 06:12    142    |  112    |  20     ----------------------------<  104    3.8     |  21     |  1.00     Ca    8.1        12 Jul 2020 06:12    TPro  6.6    /  Alb  2.7    /  TBili  0.7    /  DBili  x      /  AST  17     /  ALT  24     /  AlkPhos  44     12 Jul 2020 06:12              Culture - Body Fluid with Gram Stain (collected 07-11-20 @ 22:03)  Source: .Body Fluid Synovial Fluid  Gram Stain (07-11-20 @ 23:51):    polymorphonuclear leukocytes seen    No organisms seen    by cytocentrifuge        RADIOLOGY & ADDITIONAL TESTS:    Personally reviewed.     Consultant(s) Notes Reviewed:  [x] YES  [ ] NO

## 2020-07-12 NOTE — PROGRESS NOTE ADULT - SUBJECTIVE AND OBJECTIVE BOX
No events overnight.     ICU Vital Signs Last 24 Hrs  T(C): 36.7 (12 Jul 2020 04:48), Max: 37.2 (11 Jul 2020 21:21)  T(F): 98 (12 Jul 2020 04:48), Max: 99 (11 Jul 2020 21:21)  HR: 66 (12 Jul 2020 04:48) (66 - 82)  BP: 98/67 (12 Jul 2020 04:48) (98/67 - 145/82)  BP(mean): --  ABP: --  ABP(mean): --  RR: 19 (12 Jul 2020 04:48) (16 - 19)  SpO2: 95% (12 Jul 2020 04:48) (95% - 98%)      LLE:  Small skin lesion over anterolateral shin without drainage.   Erythema diffusely about lower extremity from ankle to inferior knee that wraps around anterolateral shin/knee about lateral joint line.   Small effusion.   Subcutaneous swelling about infrapatellar bursa without focal fluid collection  ROM0-45, no pain with micromotion. Pain after 45 degrees of flexion.   Able to SLR; - Log Roll/Heel Strike  Motor intact GS/TA/FHL/EHL  SILT L2-S1  DP/PT pulses 2+  Calf soft and compressible  No ankle effusion  No hip pain. Full AROM of hip without pain    RLE/BUE:   No bony TTP; Good ROM w/o pain; Exam Unremarkable    Imaging:  XR demonstrating L knee osteoarthritis, no obvious fracture, small effusion.     80M with LLE cellulitis and early prepatellar bursitis    -Knee aspirated, low cell count. No concerns for joint involvement.  -iv abx for cellulitis  -likely component of early prepatellar/infrapatellar bursitis but no focal fluid collection to I+D  -Will follow  -Unlikely to require surgical intervention based on current presentation  -Trend ESR/CRP  -Broad spectrum abx  -FU knee synovial cx results  -Medical management  -ICE and elevation leg  -DW Dr. Pop, agrees with above.

## 2020-07-12 NOTE — CONSULT NOTE ADULT - ASSESSMENT
80M with PMH prosate cancer, colon cancer, dyslipidemia, HYN presents with acute onset of L knee pain. Fluid studies not impressive for infection and between the interval when I saw him earlier today and writing this note the fluid reveals CPPD crystals. Remote suspicion for infection here. Lyme despite outdoor exposure would appear unlikely-- monoarthritis due to Lyme disease not usually an acute phenomenon, it's typically a 3rd stage disease weeks-months after exposure. If Lyme IgM positive here, highly likley it would represent a false positive. If IgG positive with positive western blot, it is far from clear whether he would merit treatment. Asymptomatic seroconversion does not merit treatment, the question would be whether this arthritis is related to lyme and I suspect not. Pseudogout likely the unifying diagnosis here.     Suggestions--  Stop antibiotics  F/U Cx data  CPPD per primary team/ortho    Dr. Lemus to resume care in am.    Thank you for the courtesy of this referral.    Catrachito Lara MD  Attending Physician  [facility]  Division of Infectious Diseases  [phone]

## 2020-07-12 NOTE — PROGRESS NOTE ADULT - PROBLEM SELECTOR PLAN 3
- chronic HTN pt, well controlled on admission 118/66,   - takes home medications metoprolol 25mg QD  - to confirm medications in AM, continue metoprolol 25mg QD with hold parameters while inpt  - DASH diet

## 2020-07-12 NOTE — PROGRESS NOTE ADULT - PROBLEM SELECTOR PLAN 4
pt pharmacy [facility] in [place], currently closed  attempted to confirm medication with wife, she didn't answer the phone, incomplete medication reconciliation, primary team to complete in AM -pt pharmacy [facility] in [place], closed at time of admission  -meds confirmed on 7/12 with pharmacy

## 2020-07-12 NOTE — PROGRESS NOTE ADULT - ASSESSMENT
79 yo male with PMhx BPH, HTN, HLD, colon cancer 4 years ago, presents to ED with left knee pain. Admitted for cellulitis of left leg.

## 2020-07-12 NOTE — PHYSICAL THERAPY INITIAL EVALUATION ADULT - RANGE OF MOTION EXAMINATION, REHAB EVAL
deficits as listed below/Right LE ROM was WFL (within functional limits)/s/p L knee aspiration/bilateral upper extremity ROM was WFL (within functional limits)

## 2020-07-12 NOTE — PROGRESS NOTE ADULT - PROBLEM SELECTOR PLAN 5
continue home medications tamsulosin 0.4mg QD continue home medications tamsulosin 0.4mg QD and finasteride 5mg qd

## 2020-07-12 NOTE — PHYSICAL THERAPY INITIAL EVALUATION ADULT - FOLLOWS COMMANDS/ANSWERS QUESTIONS, REHAB EVAL
100% of the time/able to follow multistep instructions/[place]. B hearing aides not available at bedside.

## 2020-07-12 NOTE — PROGRESS NOTE ADULT - PROBLEM SELECTOR PLAN 2
- pt left lower extremity erythematous well demarcated, warm, swollen  - s/p in ED vanco 1g, zosyn 3.375g  - continuing vanco 1g Q12, zosyn 3.375g Q8  - f/u vanco troughs pre 4th dose, adjust as necessary  -Blood Cx NGTD  - ortho consulted, Low suspicion for joint involvement. Knee aspirated with 10cc of straw colored normal synovial fluid, f/u knee synovial results  - Trend ESR/CRP  - ICE and elevation leg

## 2020-07-12 NOTE — PHYSICAL THERAPY INITIAL EVALUATION ADULT - PERTINENT HX OF CURRENT PROBLEM, REHAB EVAL
81 yo male with PMhx BPH, HTN, HLD, colon cancer 4 years ago, presents to ED with left knee pain. Pt was gardening on his knees 2 days ago and began to feel right knee pain. He's been treating his pain with aspirin and decided to come to ED when he became unable to ambulate 2/2 the pain.

## 2020-07-13 PROBLEM — Z98.890 OTHER SPECIFIED POSTPROCEDURAL STATES: Chronic | Status: ACTIVE | Noted: 2019-09-27

## 2020-07-13 LAB
ALBUMIN SERPL ELPH-MCNC: 2.6 G/DL — LOW (ref 3.3–5)
ALP SERPL-CCNC: 47 U/L — SIGNIFICANT CHANGE UP (ref 40–120)
ALT FLD-CCNC: 25 U/L — SIGNIFICANT CHANGE UP (ref 12–78)
ANION GAP SERPL CALC-SCNC: 7 MMOL/L — SIGNIFICANT CHANGE UP (ref 5–17)
AST SERPL-CCNC: 17 U/L — SIGNIFICANT CHANGE UP (ref 15–37)
BASOPHILS # BLD AUTO: 0.03 K/UL — SIGNIFICANT CHANGE UP (ref 0–0.2)
BASOPHILS NFR BLD AUTO: 0.4 % — SIGNIFICANT CHANGE UP (ref 0–2)
BILIRUB SERPL-MCNC: 0.5 MG/DL — SIGNIFICANT CHANGE UP (ref 0.2–1.2)
BUN SERPL-MCNC: 21 MG/DL — SIGNIFICANT CHANGE UP (ref 7–23)
CALCIUM SERPL-MCNC: 8.6 MG/DL — SIGNIFICANT CHANGE UP (ref 8.5–10.1)
CHLORIDE SERPL-SCNC: 111 MMOL/L — HIGH (ref 96–108)
CO2 SERPL-SCNC: 24 MMOL/L — SIGNIFICANT CHANGE UP (ref 22–31)
CREAT SERPL-MCNC: 0.94 MG/DL — SIGNIFICANT CHANGE UP (ref 0.5–1.3)
CRP SERPL-MCNC: 7.55 MG/DL — HIGH (ref 0–0.4)
EOSINOPHIL # BLD AUTO: 0.1 K/UL — SIGNIFICANT CHANGE UP (ref 0–0.5)
EOSINOPHIL NFR BLD AUTO: 1.3 % — SIGNIFICANT CHANGE UP (ref 0–6)
GLUCOSE SERPL-MCNC: 94 MG/DL — SIGNIFICANT CHANGE UP (ref 70–99)
HCT VFR BLD CALC: 35.1 % — LOW (ref 39–50)
HGB BLD-MCNC: 11.9 G/DL — LOW (ref 13–17)
IMM GRANULOCYTES NFR BLD AUTO: 0.3 % — SIGNIFICANT CHANGE UP (ref 0–1.5)
LYMPHOCYTES # BLD AUTO: 1.39 K/UL — SIGNIFICANT CHANGE UP (ref 1–3.3)
LYMPHOCYTES # BLD AUTO: 18.3 % — SIGNIFICANT CHANGE UP (ref 13–44)
MCHC RBC-ENTMCNC: 30.7 PG — SIGNIFICANT CHANGE UP (ref 27–34)
MCHC RBC-ENTMCNC: 33.9 GM/DL — SIGNIFICANT CHANGE UP (ref 32–36)
MCV RBC AUTO: 90.5 FL — SIGNIFICANT CHANGE UP (ref 80–100)
MONOCYTES # BLD AUTO: 0.98 K/UL — HIGH (ref 0–0.9)
MONOCYTES NFR BLD AUTO: 12.9 % — SIGNIFICANT CHANGE UP (ref 2–14)
NEUTROPHILS # BLD AUTO: 5.07 K/UL — SIGNIFICANT CHANGE UP (ref 1.8–7.4)
NEUTROPHILS NFR BLD AUTO: 66.8 % — SIGNIFICANT CHANGE UP (ref 43–77)
NRBC # BLD: 0 /100 WBCS — SIGNIFICANT CHANGE UP (ref 0–0)
PLATELET # BLD AUTO: 241 K/UL — SIGNIFICANT CHANGE UP (ref 150–400)
POTASSIUM SERPL-MCNC: 3.9 MMOL/L — SIGNIFICANT CHANGE UP (ref 3.5–5.3)
POTASSIUM SERPL-SCNC: 3.9 MMOL/L — SIGNIFICANT CHANGE UP (ref 3.5–5.3)
PROT SERPL-MCNC: 6.6 G/DL — SIGNIFICANT CHANGE UP (ref 6–8.3)
RBC # BLD: 3.88 M/UL — LOW (ref 4.2–5.8)
RBC # FLD: 14.3 % — SIGNIFICANT CHANGE UP (ref 10.3–14.5)
SODIUM SERPL-SCNC: 142 MMOL/L — SIGNIFICANT CHANGE UP (ref 135–145)
WBC # BLD: 7.59 K/UL — SIGNIFICANT CHANGE UP (ref 3.8–10.5)
WBC # FLD AUTO: 7.59 K/UL — SIGNIFICANT CHANGE UP (ref 3.8–10.5)

## 2020-07-13 PROCEDURE — 99232 SBSQ HOSP IP/OBS MODERATE 35: CPT

## 2020-07-13 PROCEDURE — 99233 SBSQ HOSP IP/OBS HIGH 50: CPT | Mod: GC

## 2020-07-13 RX ORDER — COLCHICINE 0.6 MG
0.6 TABLET ORAL ONCE
Refills: 0 | Status: COMPLETED | OUTPATIENT
Start: 2020-07-13 | End: 2020-07-13

## 2020-07-13 RX ADMIN — MORPHINE SULFATE 1 MILLIGRAM(S): 50 CAPSULE, EXTENDED RELEASE ORAL at 19:24

## 2020-07-13 RX ADMIN — ENOXAPARIN SODIUM 40 MILLIGRAM(S): 100 INJECTION SUBCUTANEOUS at 12:40

## 2020-07-13 RX ADMIN — MORPHINE SULFATE 1 MILLIGRAM(S): 50 CAPSULE, EXTENDED RELEASE ORAL at 02:17

## 2020-07-13 RX ADMIN — FINASTERIDE 5 MILLIGRAM(S): 5 TABLET, FILM COATED ORAL at 12:40

## 2020-07-13 RX ADMIN — Medication 0.6 MILLIGRAM(S): at 12:59

## 2020-07-13 RX ADMIN — Medication 20 MILLIGRAM(S): at 13:00

## 2020-07-13 NOTE — PROGRESS NOTE ADULT - ASSESSMENT
81 yo male with PMhx BPH, HTN, HLD, colon cancer 4 years ago, presents to ED with left knee pain. Admitted for cellulitis of left leg. 79 yo male with PMhx BPH, HTN, HLD, colon cancer 4 years ago, presents to ED with left knee pain. Admitted for cellulitis of left leg   found to have pseudogout .

## 2020-07-13 NOTE — PROGRESS NOTE ADULT - ASSESSMENT
80M with LLE cellulitis and early prepatellar bursitis    -Knee aspirated, low cell count. No concerns for joint involvement. +CPPD crystals and chondrocalcinosis on XR, his intra articular aspiration results are not related to his current presentation. CPPD crystals are likely from degenerative process and chondrocalcinosis that are incidental. Will follow culture but unlikely to grow anything.   -likely component of early prepatellar/infrapatellar bursitis but no focal fluid collection to I+D and a component of cellulitis.   -Will follow  -Unlikely to require surgical intervention based on current presentation  -Trend ESR/CRP  -Broad spectrum abx are likely beneficial for his cellulitis but were stopped by ID.   -FU knee synovial cx results  -Medical management  -ICE and elevation leg  -Recommendations as above, medical team / infectious diseases is not concerned about septic arthritis / cellulitis at this time.     -DW Dr. Pop, agrees with above.

## 2020-07-13 NOTE — PROGRESS NOTE ADULT - PROBLEM SELECTOR PLAN 1
-joint fluid aspiration reveals calcium pyrophosphate crystals.   -Uric acid 4.5 WNL  -Colchicine 1.2 mg PO given 7/12. Colchicine 0.6mg PO ordered 7/13.  -Solumedrol 20 mg IVP ordered.  -Pain control with tylenol and percocet prn.  -Will consider d/c for tomorrow if clinical exam continues to improve.

## 2020-07-13 NOTE — DISCHARGE NOTE PROVIDER - HOSPITAL COURSE
FROM ADMISSION H+P:     HPI:    81 yo male with PMhx BPH, HTN, HLD, colon cancer 4 years ago, presents to ED with left knee pain. Pt was gardening on his knees 2 days ago and began to feel right knee pain. He's been treating his pain with aspirin and decided to come to ED when he became unable to ambulate 2/2 the pain. Pt normally able to ambulate well, very active. Denies fevers, chest pain, HA, abd pain, rashes, any other joint pain, chemotherapy/radiation hx.        In ED:         Given: NS 1L bolus, vanco 1g, zosyn 3.375g, morphine 4mg        Labs: WBC 12.84, sed rate 72, Cr 1.10        Imaging: doppler negative for DVT left leg, left knee xray: Mild osteoarthritic change. Suprapatellar effusion,         Consult: ortho -Low suspicion for joint involvement, minimal effusion. Knee aspirated with 10cc of straw colored normal synovial fluid, likely component of early prepatellar/infrapatellar bursitis but no focal fluid collection to I+D, Unlikely to require surgical intervention based on current presentation (11 Jul 2020 18:24)            ---    HOSPITAL COURSE:         Patient transferred to general medicine floor for further workup and management. Synovial fluid  with calcium pyrophosphate crystals, NGTD of cultures. ID consulted, determined to likely be secondary to pseudogout and not infectious, zosyn and vanco were discontinued. Patient with CRP of 15.07 on admission, downtrended to 7.55. Patient received colchicine and solumedrol.             ---    CONSULTANTS:         Infectious Disease: Dr. Lemus    Ortho: Dr. Pop        ---    TIME SPENT:    I, the attending physician, was physically present for the key portions of the evaluation and management (E/M) service provided. The total amount of time spent reviewing the hospital notes, laboratory values, imaging findings, assessing/counseling the patient, discussing with consultant physicians, social work, nursing staff was -- minutes        ---    Primary care provider was made aware of plan for discharge:      [  ] NO     [  ] YES FROM ADMISSION H+P:     HPI:    81 yo male with PMhx BPH, HTN, HLD, colon cancer 4 years ago, presents to ED with left knee pain. Pt was gardening on his knees 2 days ago and began to feel right knee pain. He's been treating his pain with aspirin and decided to come to ED when he became unable to ambulate 2/2 the pain. Pt normally able to ambulate well, very active. Denies fevers, chest pain, HA, abd pain, rashes, any other joint pain, chemotherapy/radiation hx.        In ED:         Given: NS 1L bolus, vanco 1g, zosyn 3.375g, morphine 4mg        Labs: WBC 12.84, sed rate 72, Cr 1.10        Imaging: doppler negative for DVT left leg, left knee xray: Mild osteoarthritic change. Suprapatellar effusion,         Consult: ortho -Low suspicion for joint involvement, minimal effusion. Knee aspirated with 10cc of straw colored normal synovial fluid, likely component of early prepatellar/infrapatellar bursitis but no focal fluid collection to I+D, Unlikely to require surgical intervention based on current presentation (11 Jul 2020 18:24)        Vital Signs Last 24 Hrs    T(C): 36.7 (14 Jul 2020 04:37), Max: 36.8 (13 Jul 2020 21:44)    T(F): 98 (14 Jul 2020 04:37), Max: 98.3 (13 Jul 2020 21:44)    HR: 61 (14 Jul 2020 04:37) (61 - 67)    BP: 125/74 (14 Jul 2020 04:37) (125/74 - 159/72)    BP(mean): --    RR: 17 (14 Jul 2020 04:37) (16 - 17)    SpO2: 97% (14 Jul 2020 04:37) (95% - 97%)        PHYSICAL EXAM:    GENERAL: NAD, awake and alert.    HEENT:  anicteric, moist mucous membranes    CHEST/LUNG:  CTA b/l, no rales, wheezes, or rhonchi    HEART:  RRR, S1, S2    ABDOMEN: soft, nontender, nondistended    EXTREMITIES:     LLE: Knee swelling and erythema present (improving), skin intact, warm to touch. ROM painful in F/Ext knee, SILT L2-S1, Motor intact, + DP pulse, Compartments soft and compressible. Calf NTTP.    RLE/RUE/LUE no lesions/swelling/gross deformities appreciated.     NERVOUS SYSTEM:  aao/3 motor intact all 4ext 5/5 , sensory intact all 4 ext             HOSPITAL COURSE:         Patient transferred to general medicine floor for further workup and management. Synovial fluid  with calcium pyrophosphate crystals, NGTD of cultures. ID consulted, determined to likely be secondary to pseudogout and not infectious, zosyn and vanco were discontinued. Patient with CRP of 15.07 on admission, downtrended to 7.55. Patient received 2 doses of colchicine and started on solumedrol with marked improvement of the swelling, erythema, warmth. The pt reports improvement of tenderness and decreased pain with ambulation. He reports the pain is 3/10, he has no other concerns, denies fever/chills/headache/myalgias/n/v/d/numness/tingling/weakness. Plan to d/c today with Allopurinol and prednisone taper, pt must follow up with primary doctor within the next 5 days.         CONSULTANTS:         Infectious Disease: Dr. Lemus    Ortho: Dr. Pop        ---    TIME SPENT:    I, the attending physician, was physically present for the key portions of the evaluation and management (E/M) service provided. The total amount of time spent reviewing the hospital notes, laboratory values, imaging findings, assessing/counseling the patient, discussing with consultant physicians, social work, nursing staff was -- minutes        ---    Primary care provider was made aware of plan for discharge:      [  ] NO     [  ] YES FROM ADMISSION H+P:     HPI:    81 yo male with PMhx BPH, HTN, HLD, colon cancer 4 years ago, presents to ED with left knee pain. Pt was gardening on his knees 2 days ago and began to feel right knee pain. He's been treating his pain with aspirin and decided to come to ED when he became unable to ambulate 2/2 the pain. Pt normally able to ambulate well, very active. Denies fevers, chest pain, HA, abd pain, rashes, any other joint pain, chemotherapy/radiation hx.        HOSPITAL COURSE:         Patient transferred to general medicine floor for further workup and management. Synovial fluid  with calcium pyrophosphate crystals, NGTD of cultures. ID consulted  dr copeland gp , determined to likely be secondary to pseudogout and not infectious, zosyn and vanco were discontinued   , ruled  out septic arthritis   and cellulitis  . Patient with CRP of 15.07 on admission, downtrended to 7.55. Patient received 2 doses of colchicine and started on solumedrol with marked improvement of the swelling, erythema, warmth. The pt reports improvement of tenderness and decreased pain with ambulation. He reports the pain is 3/10, he has no other concerns, denies fever/chills/headache/myalgias/n/v/d/numness/tingling/weakness. Plan to d/c today with Allopurinol and prednisone taper, pt must follow up with primary doctor within the next 5 days.     acute onset of Lt knee pain sec to gout attack related pain and swelling better with po prednisone.     medically stable      physical day of dc 7-14-20     physical exam  day of dc    Vital Signs Last 24 Hrs    T(C): 36.7 (14 Jul 2020 04:37), Max: 36.8 (13 Jul 2020 21:44)    T(F): 98 (14 Jul 2020 04:37), Max: 98.3 (13 Jul 2020 21:44)    HR: 61 (14 Jul 2020 04:37) (61 - 67)    BP: 125/74 (14 Jul 2020 04:37) (125/74 - 159/72)    BP(mean): --    RR: 17 (14 Jul 2020 04:37) (16 - 17)    SpO2: 97% (14 Jul 2020 04:37) (95% - 97%)  GEN no distress , HEENT nt/nc , perrla , CVS s1s2 no tachy , CHEST bl air entery   present , no wheezing , no rale  , CNS aao/3  motor intact 5/5 all 4ext  , sensory intact  , GI soft , bs present , EXT no edema, pedal pulse present , SKIN no rash , lt knee  swelling mild , mild tenderness .            Infectious Disease: Dr. Copeland    Ortho: Dr. Pop    total time spend 50 min .         ---    Primary care provider was made aware of plan for discharge:      [  ] NO     [  y] YES

## 2020-07-13 NOTE — PROGRESS NOTE ADULT - PROBLEM SELECTOR PLAN 3
- chronic HTN pt, well controlled on admission 118/66,   - takes home medications metoprolol 25mg QD  - to confirm medications in AM, continue metoprolol 25mg QD with hold parameters while inpt  - DASH diet - chronic HTN pt, well controlled on admission 118/66,   - takes home medications metoprolol 25mg QD - continue metoprolol 25mg QD with hold parameters while inpt  - DASH diet

## 2020-07-13 NOTE — DISCHARGE NOTE PROVIDER - CARE PROVIDER_API CALL
[place] [address]  Phone: [phone]  Fax: [phone]  Follow Up Time: 1 week

## 2020-07-13 NOTE — PROGRESS NOTE ADULT - PROBLEM SELECTOR PLAN 2
- pt left lower extremity erythematous well demarcated, warm, swollen  - s/p in ED vanco 1g, zosyn 3.375g  - Stopped vanco 1g Q12, zosyn 3.375g Q8  -Blood Cx NGTD  -ortho consulted, Low suspicion for joint involvement. Knee aspirated with 10cc of straw colored normal synovial fluid (calcium pyrophosphate crystals).  - Trend ESR/CRP  - ICE and elevation leg - pt left lower extremity erythematous well demarcated, warm, swollen  - s/p in ED vanco 1g, zosyn 3.375g  - Stopped vanco 1g Q12, zosyn 3.375g Q8  -Blood Cx NGTD  -ortho consulted, Low suspicion for joint involvement. Knee aspirated with 10cc of straw colored normal synovial fluid (calcium pyrophosphate crystals).  - Trend ESR/CRP  - ICE and elevation leg  - ID Dr. Lemus following - no need for antibiotics at this time, more likely 2/2 to pseudogout - pt left lower extremity erythematous well demarcated, warm, swollen- but low suspicion of cellulitis .  - s/p in ED vanco 1g, zosyn 3.375g  - Stopped vanco 1g Q12, zosyn 3.375g Q8  -Blood Cx NGTD  -ortho consulted, Low suspicion for joint involvement. Knee aspirated with 10cc of straw colored normal synovial fluid (calcium pyrophosphate crystals).  - Trend ESR/CRP  - ICE and elevation leg  - ID Dr. Lemus following - no need for antibiotics at this time, more likely 2/2 to pseudogout

## 2020-07-13 NOTE — PROGRESS NOTE ADULT - PROBLEM SELECTOR PLAN 4
-pt pharmacy [facility] in [place], closed at time of admission  -meds confirmed on 7/12 with pharmacy

## 2020-07-13 NOTE — PROGRESS NOTE ADULT - SUBJECTIVE AND OBJECTIVE BOX
Patient is a 80y old  Male who presents with a chief complaint of cellulitis (13 Jul 2020 09:46)      Admitted for cellulitis of left leg / left knee swelling .  INTERVAL HPI/OVERNIGHT EVENTS: Pt was seen and examined at the bedside this morning.  pt  states that his knee pain has improved but he still has some pain with ambulation and if he touches the lateral knee. He denies having fever/chills/headache/chest pain or palpitations/ SOB/ abd pan/diarrhea/numbness or tingling in the left or right leg.     MEDICATIONS  (STANDING):  aspirin enteric coated 81 milliGRAM(s) Oral daily  atorvastatin 20 milliGRAM(s) Oral at bedtime  colchicine 0.6 milliGRAM(s) Oral once  enoxaparin Injectable 40 milliGRAM(s) SubCutaneous daily  fenofibrate Tablet 145 milliGRAM(s) Oral daily  finasteride 5 milliGRAM(s) Oral daily  methylPREDNISolone sodium succinate Injectable 20 milliGRAM(s) IV Push once  metoprolol succinate ER 25 milliGRAM(s) Oral daily  tamsulosin 0.4 milliGRAM(s) Oral at bedtime    MEDICATIONS  (PRN):  acetaminophen   Tablet .. 650 milliGRAM(s) Oral every 6 hours PRN Mild Pain (1 - 3)  ALPRAZolam 0.25 milliGRAM(s) Oral two times a day PRN anxiety  morphine  - Injectable 0.5 milliGRAM(s) IV Push every 4 hours PRN Moderate Pain (4 - 6)  morphine  - Injectable 1 milliGRAM(s) IV Push every 4 hours PRN Severe Pain (7 - 10)      Allergies    No Known Allergies    Intolerances        REVIEW OF SYSTEMS:  CONSTITUTIONAL: No fever.  HEENT: No headache  RESPIRATORY: No cough or shortness of breath  CARDIOVASCULAR: No chest pain  GASTROINTESTINAL: No abd pain, nausea, vomiting, or diarrhea  NEUROLOGICAL: No focal weakness or dizziness  MUSCULOSKELETAL: No myalgias       Vital Signs Last 24 Hrs  T(C): 35.6 (13 Jul 2020 12:38), Max: 36.7 (12 Jul 2020 14:24)  T(F): 96 (13 Jul 2020 12:38), Max: 98.1 (12 Jul 2020 14:24)  HR: 65 (13 Jul 2020 12:38) (65 - 71)  BP: 179/142 (13 Jul 2020 12:38) (134/75 - 179/142)  BP(mean): 81 (13 Jul 2020 12:38) (81 - 81)  RR: 17 (13 Jul 2020 12:38) (17 - 19)  SpO2: 96% (13 Jul 2020 12:38) (96% - 96%)    PHYSICAL EXAM:  GENERAL: NAD, awake and alert.  HEENT:  anicteric, moist mucous membranes  CHEST/LUNG:  CTA b/l, no rales, wheezes, or rhonchi  HEART:  RRR, S1, S2  ABDOMEN: soft, nontender, nondistended  EXTREMITIES:   LLE: Knee swelling and erythema present (improving), skin intact, warm to touch. ROM painful in F/Ext knee, SILT L2-S1, Motor intact, + DP pulse, Compartments soft and compressible. Calf NTTP.  RLE/RUE/LUE no lesions/swelling/gross deformities appreciated.   NERVOUS SYSTEM: answers questions and follows commands appropriately  LABS:                        11.9   7.59  )-----------( 241      ( 13 Jul 2020 06:21 )             35.1     CBC Full  -  ( 13 Jul 2020 06:21 )  WBC Count : 7.59 K/uL  Hemoglobin : 11.9 g/dL  Hematocrit : 35.1 %  Platelet Count - Automated : 241 K/uL  Mean Cell Volume : 90.5 fl  Mean Cell Hemoglobin : 30.7 pg  Mean Cell Hemoglobin Concentration : 33.9 gm/dL  Auto Neutrophil # : 5.07 K/uL  Auto Lymphocyte # : 1.39 K/uL  Auto Monocyte # : 0.98 K/uL  Auto Eosinophil # : 0.10 K/uL  Auto Basophil # : 0.03 K/uL  Auto Neutrophil % : 66.8 %  Auto Lymphocyte % : 18.3 %  Auto Monocyte % : 12.9 %  Auto Eosinophil % : 1.3 %  Auto Basophil % : 0.4 %    13 Jul 2020 06:21    142    |  111    |  21     ----------------------------<  94     3.9     |  24     |  0.94     Ca    8.6        13 Jul 2020 06:21    TPro  6.6    /  Alb  2.6    /  TBili  0.5    /  DBili  x      /  AST  17     /  ALT  25     /  AlkPhos  47     13 Jul 2020 06:21        CAPILLARY BLOOD GLUCOSE            Culture - Body Fluid with Gram Stain (collected 07-11-20 @ 22:03)  Source: .Body Fluid Synovial Fluid  Gram Stain (07-11-20 @ 23:51):    polymorphonuclear leukocytes seen    No organisms seen    by cytocentrifuge  Preliminary Report (07-12-20 @ 16:39):    No growth    Culture - Blood (collected 07-11-20 @ 21:34)  Source: .Blood Blood  Preliminary Report (07-12-20 @ 22:01):    No growth to date.    Culture - Blood (collected 07-11-20 @ 21:34)  Source: .Blood Blood  Preliminary Report (07-12-20 @ 22:01):    No growth to date.        RADIOLOGY & ADDITIONAL TESTS:    Personally reviewed.     Consultant(s) Notes Reviewed:  [x] YES  [ ] NO Patient is a 80y old  Male who presents with a chief complaint of cellulitis (13 Jul 2020 09:46)      Admitted for cellulitis of left leg / left knee swelling   possible pseudo gouty attack .  INTERVAL HPI/OVERNIGHT EVENTS: Pt was seen and examined at the bedside this morning.  pt  states that his lt  knee pain has improved but he still has some pain with ambulation and if he touches the lateral knee. He denies having fever/chills/headache/chest pain or palpitations/ SOB/ abd pan/diarrhea/numbness           REVIEW OF SYSTEMS:  CONSTITUTIONAL: No fever.  HEENT: No headache  RESPIRATORY: No cough or shortness of breath  CARDIOVASCULAR: No chest pain  GASTROINTESTINAL: No abd pain, nausea, vomiting, or diarrhea  NEUROLOGICAL: No focal weakness or dizziness  MUSCULOSKELETAL: No myalgias , knee swelling better +      Vital Signs Last 24 Hrs  T(C): 35.6 (13 Jul 2020 12:38), Max: 36.7 (12 Jul 2020 14:24)  T(F): 96 (13 Jul 2020 12:38), Max: 98.1 (12 Jul 2020 14:24)  HR: 65 (13 Jul 2020 12:38) (65 - 71)  BP: 179/142 (13 Jul 2020 12:38) (134/75 - 179/142)  BP(mean): 81 (13 Jul 2020 12:38) (81 - 81)  RR: 17 (13 Jul 2020 12:38) (17 - 19)  SpO2: 96% (13 Jul 2020 12:38) (96% - 96%)    PHYSICAL EXAM:  GENERAL: NAD, awake and alert.  HEENT:  anicteric, moist mucous membranes  CHEST/LUNG:  CTA b/l, no rales, wheezes, or rhonchi  HEART:  RRR, S1, S2  ABDOMEN: soft, nontender, nondistended  EXTREMITIES:   LLE: Knee swelling and erythema present (improving), skin intact, warm to touch. ROM painful in F/Ext knee, SILT L2-S1, Motor intact, + DP pulse, Compartments soft and compressible. Calf NTTP.  RLE/RUE/LUE no lesions/swelling/gross deformities appreciated.   NERVOUS SYSTEM:  aao/3 motor intact all 4ext 5/5 , sensory intact all 4 ext       MEDICATIONS  (STANDING):  aspirin enteric coated 81 milliGRAM(s) Oral daily  atorvastatin 20 milliGRAM(s) Oral at bedtime  colchicine 0.6 milliGRAM(s) Oral once  enoxaparin Injectable 40 milliGRAM(s) SubCutaneous daily  fenofibrate Tablet 145 milliGRAM(s) Oral daily  finasteride 5 milliGRAM(s) Oral daily  methylPREDNISolone sodium succinate Injectable 20 milliGRAM(s) IV Push once  metoprolol succinate ER 25 milliGRAM(s) Oral daily  tamsulosin 0.4 milliGRAM(s) Oral at bedtime    MEDICATIONS  (PRN):  acetaminophen   Tablet .. 650 milliGRAM(s) Oral every 6 hours PRN Mild Pain (1 - 3)  ALPRAZolam 0.25 milliGRAM(s) Oral two times a day PRN anxiety  morphine  - Injectable 0.5 milliGRAM(s) IV Push every 4 hours PRN Moderate Pain (4 - 6)  morphine  - Injectable 1 milliGRAM(s) IV Push every 4 hours PRN Severe Pain (7 - 10)      LABS:                        11.9   7.59  )-----------( 241      ( 13 Jul 2020 06:21 )             35.1     CBC Full  -  ( 13 Jul 2020 06:21 )  WBC Count : 7.59 K/uL  Hemoglobin : 11.9 g/dL  Hematocrit : 35.1 %  Platelet Count - Automated : 241 K/uL  Mean Cell Volume : 90.5 fl  Mean Cell Hemoglobin : 30.7 pg  Mean Cell Hemoglobin Concentration : 33.9 gm/dL  Auto Neutrophil # : 5.07 K/uL  Auto Lymphocyte # : 1.39 K/uL  Auto Monocyte # : 0.98 K/uL  Auto Eosinophil # : 0.10 K/uL  Auto Basophil # : 0.03 K/uL  Auto Neutrophil % : 66.8 %  Auto Lymphocyte % : 18.3 %  Auto Monocyte % : 12.9 %  Auto Eosinophil % : 1.3 %  Auto Basophil % : 0.4 %    13 Jul 2020 06:21    142    |  111    |  21     ----------------------------<  94     3.9     |  24     |  0.94     Ca    8.6        13 Jul 2020 06:21    TPro  6.6    /  Alb  2.6    /  TBili  0.5    /  DBili  x      /  AST  17     /  ALT  25     /  AlkPhos  47     13 Jul 2020 06:21        CAPILLARY BLOOD GLUCOSE            Culture - Body Fluid with Gram Stain (collected 07-11-20 @ 22:03)  Source: .Body Fluid Synovial Fluid  Gram Stain (07-11-20 @ 23:51):    polymorphonuclear leukocytes seen    No organisms seen    by cytocentrifuge  Preliminary Report (07-12-20 @ 16:39):    No growth    Culture - Blood (collected 07-11-20 @ 21:34)  Source: .Blood Blood  Preliminary Report (07-12-20 @ 22:01):    No growth to date.    Culture - Blood (collected 07-11-20 @ 21:34)  Source: .Blood Blood  Preliminary Report (07-12-20 @ 22:01):    No growth to date.        RADIOLOGY & ADDITIONAL TESTS:    Personally reviewed.     Consultant(s) Notes Reviewed:  [x] YES  [ ] NO

## 2020-07-13 NOTE — PROGRESS NOTE ADULT - SUBJECTIVE AND OBJECTIVE BOX
[facility]  INFECTIOUS DISEASES   =======================================================    LAURITA YOUNG [id number]    Follow up: left knee arthritis, R/O Septic arthritis     Feels better, swelling and pain has improved.     Allergies:  No Known Allergies    Antibiotics:  None    REVIEW OF SYSTEMS:  CONSTITUTIONAL:  No Fever or chills  HEENT:   No diplopia or blurred vision.  No earache, sore throat or runny nose.  CARDIOVASCULAR:  No chest pain or SOB  RESPIRATORY:  No cough, shortness of breath, PND or orthopnea.  GASTROINTESTINAL:  No nausea, vomiting or diarrhea.  GENITOURINARY:  No dysuria, frequency or urgency. No Blood in urine  MUSCULOSKELETAL:  no joint aches, no muscle pain  SKIN:  No change in skin, hair or nails.  NEUROLOGIC:  No paresthesias or weakness.  PSYCHIATRIC:  No disorder of thought or mood.  ENDOCRINE:  No heat or cold intolerance, polyuria or polydipsia.  HEMATOLOGICAL:  No easy bruising or bleeding.     Physical Exam:  ICU Vital Signs Last 24 Hrs  T(C): 36.7 (13 Jul 2020 05:08), Max: 36.7 (12 Jul 2020 14:24)  T(F): 98 (13 Jul 2020 05:08), Max: 98.1 (12 Jul 2020 14:24)  HR: 70 (13 Jul 2020 05:08) (69 - 71)  BP: 134/75 (13 Jul 2020 05:08) (134/75 - 151/76)  RR: 18 (13 Jul 2020 05:08) (17 - 19)  SpO2: 96% (13 Jul 2020 05:08) (96% - 96%)  GEN: NAD  HEENT: normocephalic and atraumatic. EOMI. DOYLE.    NECK: Supple.  No lymphadenopathy   LUNGS: Clear to auscultation.  HEART: Regular rate and rhythm   ABDOMEN: Soft, nontender, and nondistended.  Positive bowel sounds.    : No CVA tenderness  EXTREMITIES: left knee erythema and swelling. limited ROM but able to bend partially.  MSK: no joint swelling  NEUROLOGIC: grossly intact.  PSYCHIATRIC: Appropriate affect .  SKIN: No rash    Labs:  07-13    142  |  111<H>  |  21  ----------------------------<  94  3.9   |  24  |  0.94    Ca    8.6      13 Jul 2020 06:21    TPro  6.6  /  Alb  2.6<L>  /  TBili  0.5  /  DBili  x   /  AST  17  /  ALT  25  /  AlkPhos  47  07-13                        11.9   7.59  )-----------( 241      ( 13 Jul 2020 06:21 )             35.1     LIVER FUNCTIONS - ( 13 Jul 2020 06:21 )  Alb: 2.6 g/dL / Pro: 6.6 g/dL / ALK PHOS: 47 U/L / ALT: 25 U/L / AST: 17 U/L / GGT: x           RECENT CULTURES:  07-11 @ 22:03 .Body Fluid Synovial Fluid     No growth    polymorphonuclear leukocytes seen  No organisms seen  by cytocentrifuge    07-11 @ 21:34 .Blood Blood     No growth to date.    All imaging and data are reviewed.   < from: Xray Knee 3 Views, Left (07.11.20 @ 15:38) >  EXAM:  KNEE AP LAT & OBL. LEFT                        PROCEDURE DATE:  07/11/2020    INTERPRETATION:  Pain swelling left knee.  3 views left knee.  No fracture dislocation or focal bone destruction. Mild osteoarthritic change noted. Suprapatellar effusion.  Impression: Mild osteoarthritic change. Suprapatellar effusion.    Assessment and Plan:   79y/o man with PMH prostate cancer, colon cancer, dyslipidemia and HTN was admitted with acute onset of L knee pain.  Arthrocentesis showed CPPD crystals, not impressive for Septic arthritis, already improving with treatment. CRP is going down.   All cultures remain negative.   No need for antibiotics. Treatment as per primary team/Rheumatology. Lyme serology not done but highly unlikely at this time.   Will sign off please call with any question. [facility]  INFECTIOUS DISEASES   =======================================================    LAURITA YOUNG [id number]    Follow up: left knee arthritis, R/O Septic arthritis     Feels better, swelling and pain has improved.     Allergies:  No Known Allergies    Antibiotics:  None    REVIEW OF SYSTEMS:  CONSTITUTIONAL:  No Fever or chills  HEENT:   No diplopia or blurred vision.  No earache, sore throat or runny nose.  CARDIOVASCULAR:  No chest pain or SOB  RESPIRATORY:  No cough, shortness of breath, PND or orthopnea.  GASTROINTESTINAL:  No nausea, vomiting or diarrhea.  GENITOURINARY:  No dysuria, frequency or urgency. No Blood in urine  MUSCULOSKELETAL:  no joint aches, no muscle pain  SKIN:  No change in skin, hair or nails.  NEUROLOGIC:  No paresthesias or weakness.  PSYCHIATRIC:  No disorder of thought or mood.  ENDOCRINE:  No heat or cold intolerance, polyuria or polydipsia.  HEMATOLOGICAL:  No easy bruising or bleeding.     Physical Exam:  ICU Vital Signs Last 24 Hrs  T(C): 36.7 (13 Jul 2020 05:08), Max: 36.7 (12 Jul 2020 14:24)  T(F): 98 (13 Jul 2020 05:08), Max: 98.1 (12 Jul 2020 14:24)  HR: 70 (13 Jul 2020 05:08) (69 - 71)  BP: 134/75 (13 Jul 2020 05:08) (134/75 - 151/76)  RR: 18 (13 Jul 2020 05:08) (17 - 19)  SpO2: 96% (13 Jul 2020 05:08) (96% - 96%)  GEN: NAD  HEENT: normocephalic and atraumatic. EOMI. DOYLE.    NECK: Supple.  No lymphadenopathy   LUNGS: Clear to auscultation.  HEART: Regular rate and rhythm   ABDOMEN: Soft, nontender, and nondistended.  Positive bowel sounds.    : No CVA tenderness  EXTREMITIES: left knee erythema and swelling. limited ROM but able to bend partially.  MSK: no joint swelling  NEUROLOGIC: grossly intact.  PSYCHIATRIC: Appropriate affect .  SKIN: No rash    Labs:  07-13    142  |  111<H>  |  21  ----------------------------<  94  3.9   |  24  |  0.94    Ca    8.6      13 Jul 2020 06:21    TPro  6.6  /  Alb  2.6<L>  /  TBili  0.5  /  DBili  x   /  AST  17  /  ALT  25  /  AlkPhos  47  07-13                        11.9   7.59  )-----------( 241      ( 13 Jul 2020 06:21 )             35.1     LIVER FUNCTIONS - ( 13 Jul 2020 06:21 )  Alb: 2.6 g/dL / Pro: 6.6 g/dL / ALK PHOS: 47 U/L / ALT: 25 U/L / AST: 17 U/L / GGT: x           RECENT CULTURES:  07-11 @ 22:03 .Body Fluid Synovial Fluid     No growth    polymorphonuclear leukocytes seen  No organisms seen  by cytocentrifuge    07-11 @ 21:34 .Blood Blood     No growth to date.    All imaging and data are reviewed.   < from: Xray Knee 3 Views, Left (07.11.20 @ 15:38) >  EXAM:  KNEE AP LAT & OBL. LEFT                        PROCEDURE DATE:  07/11/2020    INTERPRETATION:  Pain swelling left knee.  3 views left knee.  No fracture dislocation or focal bone destruction. Mild osteoarthritic change noted. Suprapatellar effusion.  Impression: Mild osteoarthritic change. Suprapatellar effusion.    Assessment and Plan:   79y/o man with PMH prostate cancer, colon cancer, dyslipidemia and HTN was admitted with acute onset of L knee pain.  Arthrocentesis showed CPPD crystals, not impressive for Septic arthritis, already improving with treatment. CRP is going down.   All cultures remain negative.   No need for antibiotics. Treatment as per primary team/Rheumatology. Lyme serology not done but highly unlikely at this time.   Will follow.

## 2020-07-13 NOTE — DISCHARGE NOTE PROVIDER - NSDCMRMEDTOKEN_GEN_ALL_CORE_FT
ALPRAZolam 0.25 mg oral tablet: 1 tab(s) orally 2 times a day, As Needed  aspirin 81 mg oral tablet: 1 tab(s) orally once a day  atorvastatin 20 mg oral tablet: 1 tab(s) orally once a day  Colace 100 mg oral capsule: 1 cap(s) orally 2 times a day   fenofibrate 130 mg oral capsule: 1 cap(s) orally once a day  finasteride 5 mg oral tablet: 1 tab(s) orally once a day  Metoprolol Succinate ER 25 mg oral tablet, extended release: 1 tab(s) orally once a day  tamsulosin 0.4 mg oral capsule: 1 cap(s) orally once a day allopurinol 100 mg oral tablet: 1 tab(s) orally once a day   ALPRAZolam 0.25 mg oral tablet: 1 tab(s) orally 2 times a day, As Needed  aspirin 81 mg oral tablet: 1 tab(s) orally once a day  atorvastatin 20 mg oral tablet: 1 tab(s) orally once a day  Colace 100 mg oral capsule: 1 cap(s) orally 2 times a day   fenofibrate 130 mg oral capsule: 1 cap(s) orally once a day  finasteride 5 mg oral tablet: 1 tab(s) orally once a day  Metoprolol Succinate ER 25 mg oral tablet, extended release: 1 tab(s) orally once a day  predniSONE 5 mg oral tablet: 2 tabs (10mg) once a day for 3 days then 1 tab (5mg) once a day for 3 days  tamsulosin 0.4 mg oral capsule: 1 cap(s) orally once a day

## 2020-07-13 NOTE — PROGRESS NOTE ADULT - PROBLEM SELECTOR PLAN 7
- pt developed anxiety after colon cancer dx (per daughter)   - pt has home medications alprazolam 0.25mg PRN, verified with pharmacy and istop, will order as prescribed

## 2020-07-13 NOTE — PROGRESS NOTE ADULT - ATTENDING COMMENTS
pt seen and examine today see above plan -Admitted for cellulitis of left  knee  with lt knee swelling -  possible  pseudogout attack - s/p colchicine  , percocet .   post aspiration fluid gram stain neg    dc iv vanco , will continue iv abx   Zosyn q8hr  until  final fluid  cult back .
pt seen and examine today see above plan -Admitted for cellulitis of left  knee  with lt knee swelling - found to have  possible  pseudogout attack - s/p colchicine  , s/p iv solumedrol    post aspiration  synovial fluid , gram stain neg    and fluid cult neg  so  ruled out cellulitis  and  no septic arthritis  this time. pt evaluation  done  - no need rehab pt  dc plan in 24 hr.

## 2020-07-13 NOTE — PROGRESS NOTE ADULT - SUBJECTIVE AND OBJECTIVE BOX
No events overnight.   ID evaluated, stopped abx.     Vital Signs Last 24 Hrs  T(C): 36.7 (13 Jul 2020 05:08), Max: 36.7 (12 Jul 2020 14:24)  T(F): 98 (13 Jul 2020 05:08), Max: 98.1 (12 Jul 2020 14:24)  HR: 70 (13 Jul 2020 05:08) (69 - 71)  BP: 134/75 (13 Jul 2020 05:08) (134/75 - 151/76)  BP(mean): --  RR: 18 (13 Jul 2020 05:08) (17 - 19)  SpO2: 96% (13 Jul 2020 05:08) (96% - 96%)      LLE:  Small skin lesion over anterolateral shin without drainage.   Erythema diffusely about lower extremity from ankle to inferior knee that wraps around anterolateral shin/knee about lateral joint line.   Small effusion.   Subcutaneous swelling about infrapatellar bursa without focal fluid collection  ROM0-45, no pain with micromotion. Pain after 45 degrees of flexion.   Able to SLR; - Log Roll/Heel Strike  Motor intact GS/TA/FHL/EHL  SILT L2-S1  DP/PT pulses 2+  Calf soft and compressible  No ankle effusion  No hip pain. Full AROM of hip without pain    RLE/BUE:   No bony TTP; Good ROM w/o pain; Exam Unremarkable    Imaging:  XR demonstrating L knee osteoarthritis, no obvious fracture, small effusion.     80M with LLE cellulitis and early prepatellar bursitis    -Knee aspirated, low cell count. No concerns for joint involvement. +CPPD crystals and chondrocalcinosis on XR, his intra articular aspiration results are not related to his current presentation. CPPD crystals are likely from degenerative process and chondrocalcinosis that are incidental. Will follow culture but unlikely to grow anything.   -likely component of early prepatellar/infrapatellar bursitis but no focal fluid collection to I+D and a component of cellulitis.   -Will follow  -Unlikely to require surgical intervention based on current presentation  -Trend ESR/CRP  -Broad spectrum abx are likely beneficial for his cellulitis but were stopped by ID.   -FU knee synovial cx results  -Medical management  -ICE and elevation leg  -DW Dr. Pop, agrees with above. No events overnight.   ID evaluated, stopped abx.     Vital Signs Last 24 Hrs  T(C): 36.7 (13 Jul 2020 05:08), Max: 36.7 (12 Jul 2020 14:24)  T(F): 98 (13 Jul 2020 05:08), Max: 98.1 (12 Jul 2020 14:24)  HR: 70 (13 Jul 2020 05:08) (69 - 71)  BP: 134/75 (13 Jul 2020 05:08) (134/75 - 151/76)  BP(mean): --  RR: 18 (13 Jul 2020 05:08) (17 - 19)  SpO2: 96% (13 Jul 2020 05:08) (96% - 96%)      LLE:  Small skin lesion over anterolateral shin without drainage.   Erythema diffusely about lower extremity from ankle to inferior knee that wraps around anterolateral shin/knee about lateral joint line.   Small effusion.   Subcutaneous swelling about infrapatellar bursa without focal fluid collection  ROM0-45, no pain with micromotion. Pain after 45 degrees of flexion.   Able to SLR; - Log Roll/Heel Strike  Motor intact GS/TA/FHL/EHL  SILT L2-S1  DP/PT pulses 2+  Calf soft and compressible  No ankle effusion  No hip pain. Full AROM of hip without pain    RLE/BUE:   No bony TTP; Good ROM w/o pain; Exam Unremarkable    Imaging:  XR demonstrating L knee osteoarthritis, no obvious fracture, small effusion.     80M with LLE cellulitis and early prepatellar bursitis    -Knee aspirated, low cell count. No concerns for joint involvement. +CPPD crystals and chondrocalcinosis on XR, his intra articular aspiration results are not related to his current presentation. CPPD crystals are likely from degenerative process and chondrocalcinosis that are incidental. Will follow culture but unlikely to grow anything.   -likely component of early prepatellar/infrapatellar bursitis but no focal fluid collection to I+D and a component of cellulitis.   -Will follow  -Unlikely to require surgical intervention based on current presentation  -Trend ESR/CRP  -Broad spectrum abx are likely beneficial for his cellulitis but were stopped by ID.   -FU knee synovial cx results  -Medical management  -ICE and elevation leg  -Recommendations as above, medical team / infectious diseases is not concerned about septic arthritis / cellulitis at this time.     -DW Dr. Pop, agrees with above. Pt seen and examined at bedside. No events overnight. Denies fever, CP, SOB, calf tenderness.  ID evaluated, stopped abx.     Vital Signs Last 24 Hrs  T(C): 36.7 (13 Jul 2020 05:08), Max: 36.7 (12 Jul 2020 14:24)  T(F): 98 (13 Jul 2020 05:08), Max: 98.1 (12 Jul 2020 14:24)  HR: 70 (13 Jul 2020 05:08) (69 - 71)  BP: 134/75 (13 Jul 2020 05:08) (134/75 - 151/76)  BP(mean): --  RR: 18 (13 Jul 2020 05:08) (17 - 19)  SpO2: 96% (13 Jul 2020 05:08) (96% - 96%)      LLE:  Small skin lesion over anterolateral shin without drainage.   Erythema diffusely about lower extremity from ankle to inferior knee that wraps around anterolateral shin/knee about lateral joint line.   Small effusion.   Subcutaneous swelling about infrapatellar bursa without focal fluid collection  ROM0-45, no pain with micromotion. Pain after 45 degrees of flexion.   Able to SLR; - Log Roll/Heel Strike  Motor intact GS/TA/FHL/EHL  SILT L2-S1  DP/PT pulses 2+  Calf soft and compressible  No ankle effusion  No hip pain. Full AROM of hip without pain    RLE/BUE:   No bony TTP; Good ROM w/o pain; Exam Unremarkable    Imaging:  XR demonstrating L knee osteoarthritis, no obvious fracture, small effusion.

## 2020-07-13 NOTE — DISCHARGE NOTE PROVIDER - NSDCFUADDINST_GEN_ALL_CORE_FT
Take allopurinol as directed.   Take Prednisone as directed.  Follow up with Primary care physician within 5 days.

## 2020-07-13 NOTE — DISCHARGE NOTE PROVIDER - NSDCCPCAREPLAN_GEN_ALL_CORE_FT
PRINCIPAL DISCHARGE DIAGNOSIS  Diagnosis: Pseudogout  Assessment and Plan of Treatment: -You were diagnosed with pseudogout while in the hospital after an aspiration of your left knee.   -This is caused by the accumulation of calcium pyrophosphate cystals within a joint.   -We started a medicine called Colchicine and steroid called methylprednisolone in the hospital which resulted in improvement of your symptoms.   -We plan to discharge your with a medication called Allopurinol and Prednisone. Please take as directed.  -Follow up with your primary care provider within 5 days.      SECONDARY DISCHARGE DIAGNOSES  Diagnosis: Hypertension  Assessment and Plan of Treatment: -We continued your home medications in the hospital.  -Follow up with your primay care provider for further evaluation and treatment.    Diagnosis: Hyperlipidemia  Assessment and Plan of Treatment: -We continued your home medications in the hospital.  -Follow up with your primay care provider for further evaluation and treatment.    Diagnosis: BPH without urinary obstruction  Assessment and Plan of Treatment: -We continued your home medications in the hospital.  -Follow up with your primay care provider for further evaluation and treatment.    Diagnosis: Cellulitis of left lower extremity  Assessment and Plan of Treatment: -You were started on antibiotics while in the hospital for possible cellulitis.   -We consulted with infectious disease team and decided to stop your antibiotic therapy because your clinical course aligned with pseudogout.   -There is low likelihood for cellulitis.   -Follow up with your primary care provider within 5 days. PRINCIPAL DISCHARGE DIAGNOSIS  Diagnosis: Pseudogout  Assessment and Plan of Treatment: -You were diagnosed with pseudogout while in the hospital after an aspiration of your left knee.   -This is caused by the accumulation of calcium pyrophosphate cystals within a joint.   -We started a medicine called Colchicine and steroid called methylprednisolone in the hospital which resulted in improvement of your symptoms.   -We plan to discharge your with a medication called Allopurinol and Prednisone. Please take as directed.  -Follow up with your primary care provider within 5 days.      SECONDARY DISCHARGE DIAGNOSES  Diagnosis: Hyperlipidemia  Assessment and Plan of Treatment: -We continued your home medications in the hospital.  -Follow up with your primay care provider for further evaluation and treatment.    Diagnosis: Hypertension  Assessment and Plan of Treatment: -We continued your home medications in the hospital.  -Follow up with your primay care provider for further evaluation and treatment.

## 2020-07-14 VITALS
HEART RATE: 59 BPM | DIASTOLIC BLOOD PRESSURE: 76 MMHG | OXYGEN SATURATION: 97 % | SYSTOLIC BLOOD PRESSURE: 135 MMHG | TEMPERATURE: 98 F | RESPIRATION RATE: 16 BRPM

## 2020-07-14 LAB
ANION GAP SERPL CALC-SCNC: 6 MMOL/L — SIGNIFICANT CHANGE UP (ref 5–17)
BASOPHILS # BLD AUTO: 0.02 K/UL — SIGNIFICANT CHANGE UP (ref 0–0.2)
BASOPHILS NFR BLD AUTO: 0.2 % — SIGNIFICANT CHANGE UP (ref 0–2)
BUN SERPL-MCNC: 25 MG/DL — HIGH (ref 7–23)
CALCIUM SERPL-MCNC: 8.8 MG/DL — SIGNIFICANT CHANGE UP (ref 8.5–10.1)
CHLORIDE SERPL-SCNC: 110 MMOL/L — HIGH (ref 96–108)
CO2 SERPL-SCNC: 27 MMOL/L — SIGNIFICANT CHANGE UP (ref 22–31)
CREAT SERPL-MCNC: 0.92 MG/DL — SIGNIFICANT CHANGE UP (ref 0.5–1.3)
CRP SERPL-MCNC: 4.45 MG/DL — HIGH (ref 0–0.4)
EOSINOPHIL # BLD AUTO: 0.04 K/UL — SIGNIFICANT CHANGE UP (ref 0–0.5)
EOSINOPHIL NFR BLD AUTO: 0.5 % — SIGNIFICANT CHANGE UP (ref 0–6)
GLUCOSE SERPL-MCNC: 91 MG/DL — SIGNIFICANT CHANGE UP (ref 70–99)
HCT VFR BLD CALC: 37.4 % — LOW (ref 39–50)
HGB BLD-MCNC: 12.5 G/DL — LOW (ref 13–17)
IMM GRANULOCYTES NFR BLD AUTO: 0.4 % — SIGNIFICANT CHANGE UP (ref 0–1.5)
LYMPHOCYTES # BLD AUTO: 1.66 K/UL — SIGNIFICANT CHANGE UP (ref 1–3.3)
LYMPHOCYTES # BLD AUTO: 20.3 % — SIGNIFICANT CHANGE UP (ref 13–44)
MCHC RBC-ENTMCNC: 30.6 PG — SIGNIFICANT CHANGE UP (ref 27–34)
MCHC RBC-ENTMCNC: 33.4 GM/DL — SIGNIFICANT CHANGE UP (ref 32–36)
MCV RBC AUTO: 91.7 FL — SIGNIFICANT CHANGE UP (ref 80–100)
MONOCYTES # BLD AUTO: 0.94 K/UL — HIGH (ref 0–0.9)
MONOCYTES NFR BLD AUTO: 11.5 % — SIGNIFICANT CHANGE UP (ref 2–14)
NEUTROPHILS # BLD AUTO: 5.5 K/UL — SIGNIFICANT CHANGE UP (ref 1.8–7.4)
NEUTROPHILS NFR BLD AUTO: 67.1 % — SIGNIFICANT CHANGE UP (ref 43–77)
NRBC # BLD: 0 /100 WBCS — SIGNIFICANT CHANGE UP (ref 0–0)
PLATELET # BLD AUTO: 274 K/UL — SIGNIFICANT CHANGE UP (ref 150–400)
POTASSIUM SERPL-MCNC: 4 MMOL/L — SIGNIFICANT CHANGE UP (ref 3.5–5.3)
POTASSIUM SERPL-SCNC: 4 MMOL/L — SIGNIFICANT CHANGE UP (ref 3.5–5.3)
RBC # BLD: 4.08 M/UL — LOW (ref 4.2–5.8)
RBC # FLD: 13.8 % — SIGNIFICANT CHANGE UP (ref 10.3–14.5)
SODIUM SERPL-SCNC: 143 MMOL/L — SIGNIFICANT CHANGE UP (ref 135–145)
WBC # BLD: 8.19 K/UL — SIGNIFICANT CHANGE UP (ref 3.8–10.5)
WBC # FLD AUTO: 8.19 K/UL — SIGNIFICANT CHANGE UP (ref 3.8–10.5)

## 2020-07-14 PROCEDURE — 87040 BLOOD CULTURE FOR BACTERIA: CPT

## 2020-07-14 PROCEDURE — 84550 ASSAY OF BLOOD/URIC ACID: CPT

## 2020-07-14 PROCEDURE — 97110 THERAPEUTIC EXERCISES: CPT

## 2020-07-14 PROCEDURE — 99232 SBSQ HOSP IP/OBS MODERATE 35: CPT

## 2020-07-14 PROCEDURE — 97530 THERAPEUTIC ACTIVITIES: CPT

## 2020-07-14 PROCEDURE — 96365 THER/PROPH/DIAG IV INF INIT: CPT

## 2020-07-14 PROCEDURE — 86769 SARS-COV-2 COVID-19 ANTIBODY: CPT

## 2020-07-14 PROCEDURE — 93971 EXTREMITY STUDY: CPT

## 2020-07-14 PROCEDURE — 97162 PT EVAL MOD COMPLEX 30 MIN: CPT

## 2020-07-14 PROCEDURE — 85027 COMPLETE CBC AUTOMATED: CPT

## 2020-07-14 PROCEDURE — 89060 EXAM SYNOVIAL FLUID CRYSTALS: CPT

## 2020-07-14 PROCEDURE — 73562 X-RAY EXAM OF KNEE 3: CPT

## 2020-07-14 PROCEDURE — 87075 CULTR BACTERIA EXCEPT BLOOD: CPT

## 2020-07-14 PROCEDURE — 96375 TX/PRO/DX INJ NEW DRUG ADDON: CPT

## 2020-07-14 PROCEDURE — 99285 EMERGENCY DEPT VISIT HI MDM: CPT | Mod: 25

## 2020-07-14 PROCEDURE — 97116 GAIT TRAINING THERAPY: CPT

## 2020-07-14 PROCEDURE — 85652 RBC SED RATE AUTOMATED: CPT

## 2020-07-14 PROCEDURE — 87205 SMEAR GRAM STAIN: CPT

## 2020-07-14 PROCEDURE — 80053 COMPREHEN METABOLIC PANEL: CPT

## 2020-07-14 PROCEDURE — 83605 ASSAY OF LACTIC ACID: CPT

## 2020-07-14 PROCEDURE — 93005 ELECTROCARDIOGRAM TRACING: CPT

## 2020-07-14 PROCEDURE — 80048 BASIC METABOLIC PNL TOTAL CA: CPT

## 2020-07-14 PROCEDURE — 87635 SARS-COV-2 COVID-19 AMP PRB: CPT

## 2020-07-14 PROCEDURE — 99239 HOSP IP/OBS DSCHRG MGMT >30: CPT

## 2020-07-14 PROCEDURE — 87070 CULTURE OTHR SPECIMN AEROBIC: CPT

## 2020-07-14 PROCEDURE — 86140 C-REACTIVE PROTEIN: CPT

## 2020-07-14 PROCEDURE — 89051 BODY FLUID CELL COUNT: CPT

## 2020-07-14 PROCEDURE — 96361 HYDRATE IV INFUSION ADD-ON: CPT

## 2020-07-14 PROCEDURE — 36415 COLL VENOUS BLD VENIPUNCTURE: CPT

## 2020-07-14 RX ORDER — ALLOPURINOL 300 MG
1 TABLET ORAL
Qty: 7 | Refills: 0
Start: 2020-07-14 | End: 2020-07-20

## 2020-07-14 RX ADMIN — FINASTERIDE 5 MILLIGRAM(S): 5 TABLET, FILM COATED ORAL at 12:13

## 2020-07-14 RX ADMIN — ENOXAPARIN SODIUM 40 MILLIGRAM(S): 100 INJECTION SUBCUTANEOUS at 12:13

## 2020-07-14 NOTE — PROGRESS NOTE ADULT - SUBJECTIVE AND OBJECTIVE BOX
[facility]  INFECTIOUS DISEASES   =======================================================    LAURITA YOUNG [id number]    Follow up: left knee arthritis, R/O Septic arthritis     Feels better, swelling and pain has improved significantly, walking around.     Allergies:  No Known Allergies    Antibiotics:  None    REVIEW OF SYSTEMS:  CONSTITUTIONAL:  No Fever or chills  HEENT:   No diplopia or blurred vision.  No earache, sore throat or runny nose.  CARDIOVASCULAR:  No chest pain or SOB  RESPIRATORY:  No cough, shortness of breath, PND or orthopnea.  GASTROINTESTINAL:  No nausea, vomiting or diarrhea.  GENITOURINARY:  No dysuria, frequency or urgency. No Blood in urine  MUSCULOSKELETAL:  no joint aches, no muscle pain  SKIN:  No change in skin, hair or nails.  NEUROLOGIC:  No paresthesias or weakness.  PSYCHIATRIC:  No disorder of thought or mood.  ENDOCRINE:  No heat or cold intolerance, polyuria or polydipsia.  HEMATOLOGICAL:  No easy bruising or bleeding.     Physical Exam:  Vital Signs Last 24 Hrs  T(C): 36.7 (14 Jul 2020 04:37), Max: 36.8 (13 Jul 2020 21:44)  T(F): 98 (14 Jul 2020 04:37), Max: 98.3 (13 Jul 2020 21:44)  HR: 61 (14 Jul 2020 04:37) (61 - 67)  BP: 125/74 (14 Jul 2020 04:37) (125/74 - 159/72)  BP(mean): --  RR: 17 (14 Jul 2020 04:37) (16 - 17)  SpO2: 97% (14 Jul 2020 04:37) (95% - 97%)  GEN: NAD  HEENT: normocephalic and atraumatic. EOMI. DOYLE.    NECK: Supple.  No lymphadenopathy   LUNGS: Clear to auscultation.  HEART: Regular rate and rhythm   ABDOMEN: Soft, nontender, and nondistended.  Positive bowel sounds.    : No CVA tenderness  EXTREMITIES: left knee erythema and swelling better, no tenderness, ROM has improved significantly   MSK: no joint swelling  NEUROLOGIC: grossly intact.  PSYCHIATRIC: Appropriate affect .  SKIN: No rash    Labs:  07-13    142  |  111<H>  |  21  ----------------------------<  94  3.9   |  24  |  0.94    Ca    8.6      13 Jul 2020 06:21    TPro  6.6  /  Alb  2.6<L>  /  TBili  0.5  /  DBili  x   /  AST  17  /  ALT  25  /  AlkPhos  47  07-13                        12.5   8.19  )-----------( 274      ( 14 Jul 2020 08:48 )             37.4     LIVER FUNCTIONS - ( 13 Jul 2020 06:21 )  Alb: 2.6 g/dL / Pro: 6.6 g/dL / ALK PHOS: 47 U/L / ALT: 25 U/L / AST: 17 U/L / GGT: x           RECENT CULTURES:  07-11 @ 22:03 .Body Fluid Synovial Fluid     No growth    polymorphonuclear leukocytes seen  No organisms seen  by cytocentrifuge    07-11 @ 21:34 .Blood Blood     No growth to date.    All imaging and data are reviewed.   < from: Xray Knee 3 Views, Left (07.11.20 @ 15:38) >  EXAM:  KNEE AP LAT & OBL. LEFT                        PROCEDURE DATE:  07/11/2020    INTERPRETATION:  Pain swelling left knee.  3 views left knee.  No fracture dislocation or focal bone destruction. Mild osteoarthritic change noted. Suprapatellar effusion.  Impression: Mild osteoarthritic change. Suprapatellar effusion.    Assessment and Plan:   79y/o man with PMH prostate cancer, colon cancer, dyslipidemia and HTN was admitted with acute onset of L knee pain.  Arthrocentesis showed CPPD crystals, not impressive for Septic arthritis, already improving with treatment. CRP is going down.   All cultures remain negative.   No need for antibiotics. Treatment as per primary team/Rheumatology.  Will follow PRN, please call with any question.

## 2020-07-14 NOTE — DISCHARGE NOTE NURSING/CASE MANAGEMENT/SOCIAL WORK - PATIENT PORTAL LINK FT
You can access the FollowMyHealth Patient Portal offered by [facility] by registering at the following website: [email or web address]. By joining [facility]’s FollowMyHealth portal, you will also be able to view your health information using other applications (apps) compatible with our system.

## 2020-07-16 LAB
CULTURE RESULTS: SIGNIFICANT CHANGE UP
CULTURE RESULTS: SIGNIFICANT CHANGE UP
SPECIMEN SOURCE: SIGNIFICANT CHANGE UP
SPECIMEN SOURCE: SIGNIFICANT CHANGE UP

## 2020-07-25 LAB
CULTURE RESULTS: SIGNIFICANT CHANGE UP
SPECIMEN SOURCE: SIGNIFICANT CHANGE UP

## 2021-05-08 ENCOUNTER — EMERGENCY (EMERGENCY)
Facility: HOSPITAL | Age: 81
LOS: 1 days | Discharge: HOME | End: 2021-05-08
Attending: EMERGENCY MEDICINE | Admitting: EMERGENCY MEDICINE
Payer: MEDICARE

## 2021-05-08 VITALS
DIASTOLIC BLOOD PRESSURE: 84 MMHG | OXYGEN SATURATION: 97 % | RESPIRATION RATE: 18 BRPM | TEMPERATURE: 98 F | SYSTOLIC BLOOD PRESSURE: 150 MMHG | HEART RATE: 58 BPM

## 2021-05-08 VITALS
WEIGHT: 197.98 LBS | DIASTOLIC BLOOD PRESSURE: 74 MMHG | HEIGHT: 66 IN | HEART RATE: 62 BPM | RESPIRATION RATE: 18 BRPM | SYSTOLIC BLOOD PRESSURE: 136 MMHG | OXYGEN SATURATION: 97 % | TEMPERATURE: 98 F

## 2021-05-08 DIAGNOSIS — Z98.890 OTHER SPECIFIED POSTPROCEDURAL STATES: Chronic | ICD-10-CM

## 2021-05-08 PROBLEM — C18.9 MALIGNANT NEOPLASM OF COLON, UNSPECIFIED: Chronic | Status: ACTIVE | Noted: 2020-07-11

## 2021-05-08 PROCEDURE — 99283 EMERGENCY DEPT VISIT LOW MDM: CPT

## 2021-05-08 RX ORDER — CEPHALEXIN 500 MG
500 CAPSULE ORAL
Refills: 0 | Status: DISCONTINUED | OUTPATIENT
Start: 2021-05-08 | End: 2021-05-12

## 2021-05-08 RX ORDER — ALUMINUM ZIRCONIUM TRICHLOROHYDREX GLY 0.2 G/G
1 STICK TOPICAL
Qty: 28 | Refills: 0
Start: 2021-05-08 | End: 2021-06-04

## 2021-05-08 RX ORDER — CEPHALEXIN 500 MG
1 CAPSULE ORAL
Qty: 28 | Refills: 0
Start: 2021-05-08 | End: 2021-05-14

## 2021-05-08 RX ORDER — CEPHALEXIN 500 MG
500 CAPSULE ORAL ONCE
Refills: 0 | Status: COMPLETED | OUTPATIENT
Start: 2021-05-08 | End: 2021-05-08

## 2021-05-08 RX ADMIN — Medication 500 MILLIGRAM(S): at 18:10

## 2021-05-08 NOTE — ED PROVIDER NOTE - CHPI ED SYMPTOMS NEG
no dizziness/no fever/no nausea/no pain/no tingling/no vomiting/no weakness/no chills/no decreased eating/drinking

## 2021-05-08 NOTE — ED ADULT NURSE NOTE - PMH
BPH without urinary obstruction    Colon cancer  stage 1, removed cecum 4 years ago  History of carpal tunnel release    Hyperlipidemia    Hypertension

## 2021-05-08 NOTE — ED PROVIDER NOTE - NSFOLLOWUPINSTRUCTIONS_ED_ALL_ED_FT
1.  Take Keflex as prescribed.        Erysipelas       Erysipelas is an infection that affects the skin and tissues near the surface of the skin. It causes the skin to become red, swollen, and painful. The infection is most common on the legs but may also affect other areas, such as the face. With treatment, the infection usually goes away in a few days. If not treated, the infection can spread or lead to other problems, such as collections of pus (abscesses).      What are the causes?    This condition is caused by bacteria. Most often, it is caused by bacteria called streptococci.   Bacteria may get into the skin through a break in the skin, such as:  •A cut or scrape.      •An incision from surgery.      •A burn.      •An insect bite.      •An open sore.      •A crack in the skin.      Sometimes, how the bacteria infected the skin is not known.      What increases the risk?  You are more likely to develop this condition if you:  •Are a young child.      •Are older. Elderly people are more likely to get erysipelas.      •Have a weakened disease-fighting system (immune system), such as if you have HIV or AIDS.      •Have diabetes.      •Drink a lot of alcohol.      •Recently had surgery.      •Have a yeast infection of the skin.      •Have swollen legs.        What are the signs or symptoms?  The infection causes a reddened area on the skin. This reddened area may:  •Be painful and swollen.      •Have a clear border around it.      •Feel itchy and hot.      •Develop blisters or abscesses.    Other symptoms may include:  •Fever.      •Chills.      •Nausea and vomiting.      •Swollen glands (lymph nodes), such as in the neck.      •Headache.      •Fatigue.      •Loss of appetite.        How is this diagnosed?  This condition is diagnosed based on:  •A physical exam. Your health care provider will examine your skin closely.      •Your symptoms and medical history.        How is this treated?     This condition is treated with antibiotic medicine. Symptoms usually get better within a few days after starting antibiotics.      Follow these instructions at home:    Medicines     •Take your antibiotic medicine as told by your health care provider. Do not stop taking the antibiotic even if your condition starts to improve.      •Take other over-the-counter and prescription medicines only as told by your health care provider.      •Ask your health care provider if it is safe for you to take medicines for pain as needed, such as acetaminophen or ibuprofen.      General instructions     •If the affected area is on an arm or leg, raise (elevate) the affected arm or leg above the level of your heart while you are sitting or lying down.      • Do not put any creams or lotions on the affected area of your skin unless your health care provider tells you to do that.      • Do not share bedding, towels, or washcloths (linens) with other people. Use only your own linens to prevent the infection from spreading to others.      •Keep all follow-up visits as told by your health care provider. This is important.        Contact a health care provider if:    •Your symptoms do not improve within 1–2 days of starting treatment.      •You develop new symptoms.      •You have a fever.      •You have a general ill feeling (malaise) with muscle aches and pains.        Get help right away if:    •Your symptoms get worse.      •You develop vomiting or diarrhea that persists.      •Your red area gets larger or turns dark in color.      •You notice red streaks coming from the infected area.        Summary    •Erysipelas is an infection that affects the skin and tissues near the surface of the skin. It causes the skin to become red, swollen, and painful.      •This condition is caused by bacteria. Most often, it is caused by bacteria called streptococci.      •Bacteria may get into the skin through a break in the skin. Sometimes, how the bacteria infected the skin is not known.      •This condition is treated with antibiotic medicine. Symptoms usually get better within a few days after starting antibiotics.      This information is not intended to replace advice given to you by your health care provider. Make sure you discuss any questions you have with your health care provider.      Document Revised: 12/11/2018 Document Reviewed: 12/11/2018    [facility] Patient Education © 2021 [employer]

## 2021-05-08 NOTE — ED PROVIDER NOTE - OBJECTIVE STATEMENT
Woke up this past Thursday morning with redness and swelling of left infraorbital area, got worse today.  No fever, itchiness or chills.  No other complaints.   No prior episodes.

## 2021-05-08 NOTE — ED ADULT NURSE NOTE - OBJECTIVE STATEMENT
patient came in ED from home with c/o left side of the face redness and swelling. patient denies pain and facial pressure. no eye drainage or nasal drainage. patient denies exposure to environment within the past few days. afebrile. non-labored respiration noted. awaiting MD evaluation.

## 2021-05-08 NOTE — ED ADULT NURSE NOTE - CHPI ED NUR SYMPTOMS NEG
no agitation/no change in level of consciousness/no disorientation/no fever/no hallucinations/no paranoia/no suicidal/no weakness/no weight loss

## 2021-05-08 NOTE — ED ADULT NURSE NOTE - CAS TRG GENERAL NORM CIRC DET
Body Location Override (Optional - Billing Will Still Be Based On Selected Body Map Location If Applicable): midline upper back
Detail Level: Simple
Add 65550 Cpt? (Important Note: In 2017 The Use Of 96044 Is Being Tracked By Cms To Determine Future Global Period Reimbursement For Global Periods): yes
Strong peripheral pulses

## 2021-05-08 NOTE — ED PROVIDER NOTE - PATIENT PORTAL LINK FT
You can access the FollowMyHealth Patient Portal offered by [facility] by registering at the following website: [email or web address]. By joining [employer]’s FollowMyHealth portal, you will also be able to view your health information using other applications (apps) compatible with our system.

## 2021-09-16 NOTE — H&P PST ADULT - NSICDXPROBLEM_GEN_ALL_CORE_FT
Dapsone Pregnancy And Lactation Text: This medication is Pregnancy Category C and is not considered safe during pregnancy or breast feeding. PROBLEM DIAGNOSES  Problem: History of carpal tunnel release  Assessment and Plan: Pre op and 3 day of Hibiclens instructions reviewed and given.  Patient verbalized understanding, teach back method done and form signed by patient.  Take routine am meds DOS with sip of water.   Avoid NSAIDs and OTC supplements.  medical clearance requested by surgeon     Problem: Hypertension  Assessment and Plan: Cardiac meds am of surgery with sip of water   Patient verbalized understanding.

## 2022-12-09 NOTE — PROGRESS NOTE ADULT - PROBLEM SELECTOR PLAN 1
-joint fluid aspiration reveals calcium pyrophosphate crystals.   -FU uric acid levels.   -Colchicine 1.2 mg PO ordered.  -Pain control with tylenol and percocet prn.   -If cultures show no growth tomorrow, will consider adding solumedrol. FU synovial cultures. Adequate -joint fluid aspiration reveals calcium pyrophosphate crystals.   -Uric acid 4.5 WNL  -Colchicine 1.2 mg PO ordered.  -Pain control with tylenol and percocet prn.   -If cultures show no growth tomorrow, will consider adding solumedrol. FU synovial cultures.

## 2023-01-03 NOTE — ED ADULT TRIAGE NOTE - MEANS OF ARRIVAL
Allergies verified via Epic.  Per Dr. Braun's dental prophylaxis protocol, keflex e-scribed to patient's pharmacy   
wheelchair

## 2023-10-11 NOTE — ASU PATIENT PROFILE, ADULT - NS SC CAGE ALCOHOL EYE OPENER
She did get a hold of me yesterday (10/10/23)    I told her she can use another tape product.     She's also aware that we have Skintac available if needed.       Kerri NELSON P-BC  Diabetes and Wound Care       no

## 2023-11-30 NOTE — BRIEF OPERATIVE NOTE - NSICDXBRIEFPOSTOP_GEN_ALL_CORE_FT
Received request via: Pharmacy    Was the patient seen in the last year in this department? Yes   Date of last office visit 11/16/23     Per last Neurology Office Visit, when was the date of next follow up visit set for?                            Date of office visit follow up request 2 months    Does the patient have an upcoming appointment? Yes   If yes, when? 01/16/23    Does the patient have an active prescription (recently filled or refills available) for medication(s) requested? No    Does the patient have skilled nursing Plus and need 100 day supply (blood pressure, diabetes and cholesterol meds only)? Patient does not have SCP     
POST-OP DIAGNOSIS:  Carpal tunnel syndrome 09-Oct-2019 08:59:18  Mireya Lee

## 2024-03-18 NOTE — H&P ADULT - NSICDXPASTSURGICALHX_GEN_ALL_CORE_FT
Received a call from patient requesting assistance, patient does not speak english but wanted for us to call her back with an interpretor to further assist,     Called patient back with interpretor on line, to both #'s listed on file, neither answered. No VM left   
PAST SURGICAL HISTORY:  History of carpal tunnel release Right carpal tunnel release x18 years ago

## 2024-08-29 NOTE — ASU PATIENT PROFILE, ADULT - MEDICATION HERBAL REMEDIES, PROFILE
Regarding: Left testicle swallow and pain with movement  ----- Message from Kaila KWON sent at 8/29/2024  8:28 AM CDT -----  Patient Name: Luis Fernando Araujo    Specialist or PCP Name: Luis Miguel [place]    Symptoms: Left testicle swallow and pain with movement that can go from a 3 level pain to 10 level pain  Patient had a vasectomy done in March     Pregnant (females aged 13-60. If Yes, how long?) :     Call Back # : [phone]    Which State are you currently located in?: [place]     Name of Clinic Site / Acct# : [facility]     no